# Patient Record
Sex: FEMALE | Race: WHITE | NOT HISPANIC OR LATINO | Employment: OTHER | URBAN - METROPOLITAN AREA
[De-identification: names, ages, dates, MRNs, and addresses within clinical notes are randomized per-mention and may not be internally consistent; named-entity substitution may affect disease eponyms.]

---

## 2019-09-13 ENCOUNTER — THERAPY VISIT (OUTPATIENT)
Dept: PHYSICAL THERAPY | Facility: CLINIC | Age: 69
End: 2019-09-13
Payer: MEDICARE

## 2019-09-13 DIAGNOSIS — M25.561 ACUTE PAIN OF RIGHT KNEE: ICD-10-CM

## 2019-09-13 PROCEDURE — 97112 NEUROMUSCULAR REEDUCATION: CPT | Mod: GP | Performed by: PHYSICAL THERAPIST

## 2019-09-13 PROCEDURE — 97140 MANUAL THERAPY 1/> REGIONS: CPT | Mod: GP | Performed by: PHYSICAL THERAPIST

## 2019-09-13 PROCEDURE — 97161 PT EVAL LOW COMPLEX 20 MIN: CPT | Mod: GP | Performed by: PHYSICAL THERAPIST

## 2019-09-13 ASSESSMENT — ACTIVITIES OF DAILY LIVING (ADL)
KNEE_ACTIVITY_OF_DAILY_LIVING_SUM: 22
RAW_SCORE: 22
GO DOWN STAIRS: I AM UNABLE TO DO THE ACTIVITY
GO UP STAIRS: I AM UNABLE TO DO THE ACTIVITY
AS_A_RESULT_OF_YOUR_KNEE_INJURY,_HOW_WOULD_YOU_RATE_YOUR_CURRENT_LEVEL_OF_DAILY_ACTIVITY?: SEVERELY ABNORMAL
KNEEL ON THE FRONT OF YOUR KNEE: ACTIVITY IS VERY DIFFICULT
PAIN: THE SYMPTOM AFFECTS MY ACTIVITY SEVERELY
WALK: ACTIVITY IS VERY DIFFICULT
SIT WITH YOUR KNEE BENT: ACTIVITY IS VERY DIFFICULT
LIMPING: THE SYMPTOM AFFECTS MY ACTIVITY SEVERELY
HOW_WOULD_YOU_RATE_THE_CURRENT_FUNCTION_OF_YOUR_KNEE_DURING_YOUR_USUAL_DAILY_ACTIVITIES_ON_A_SCALE_FROM_0_TO_100_WITH_100_BEING_YOUR_LEVEL_OF_KNEE_FUNCTION_PRIOR_TO_YOUR_INJURY_AND_0_BEING_THE_INABILITY_TO_PERFORM_ANY_OF_YOUR_USUAL_DAILY_ACTIVITIES?: 40
GIVING WAY, BUCKLING OR SHIFTING OF KNEE: I DO NOT HAVE THE SYMPTOM
STIFFNESS: THE SYMPTOM AFFECTS MY ACTIVITY SEVERELY
STAND: ACTIVITY IS FAIRLY DIFFICULT
HOW_WOULD_YOU_RATE_THE_OVERALL_FUNCTION_OF_YOUR_KNEE_DURING_YOUR_USUAL_DAILY_ACTIVITIES?: SEVERELY ABNORMAL
KNEE_ACTIVITY_OF_DAILY_LIVING_SCORE: 31.43
SQUAT: ACTIVITY IS VERY DIFFICULT
RISE FROM A CHAIR: ACTIVITY IS FAIRLY DIFFICULT
WEAKNESS: THE SYMPTOM AFFECTS MY ACTIVITY SEVERELY
SWELLING: I DO NOT HAVE THE SYMPTOM

## 2019-09-13 NOTE — PROGRESS NOTES
[facility] Initial Evaluation  Subjective:  The history is provided by the patient. No  was used.   Taty Carrington being seen for RIght Knee.   Problem began 9/2/2019. Where condition occurred: for unknown reasons.Problem occurred: Unknown reason; woke up 9/2/19 w/ pain   and reported as 9/10 (9/10 during activity) on pain scale. General health as reported by patient is good. Pertinent medical history includes:  Osteoarthritis, high blood pressure, overweight and migraines/headaches.  Medical allergies: other.  Surgeries include:  None.  Current medications:  Anti-inflammatory and high blood pressure medication.     Pain is described as aching and is intermittent. Pain is the same all the time. Since onset symptoms are unchanged. Special tests:  X-ray.     Patient is Retired previously pre-; Lives at home w/  in home with a basement .   Barriers include:  Stairs and transportation (stairs to basement; has not been driving self this week due to pain).  Red flags:  None as reported by patient.  Type of problem:  Bilateral knees (R>L)   Condition occurred with:  Degenerative joint disease. This is a new condition   Problem details: Has had B knee pain for past 4 years; exacerbation of R knee 9/2/19, worse than previously.   Patient reports pain:  Medial. Radiates to: none. Associated symptoms:  Edema, loss of motion/stiffness and tingling. Symptoms are exacerbated by walking, transfers and sitting (avoiding kneeling, squatting, and stairs) and relieved by ice, NSAID's and rest.                      Objective:  System                                           Hip Evaluation    Hip Strength:    Flexion:   Left: 4+/5   Pain:  Right: 4+/5   Pain:                    Extension:  Left: 4+/5  Pain:Right: 4+/5    Pain:    Abduction:  Left: 4+/5     Pain:Right: 4+/5    Pain:                           Knee Evaluation:  ROM:    AROM    Hyperextension: Left:  5     Right:  Extension: Left:    Right:  Lacking 3 ++pain  Flexion: Left: 135    Right: 120 +pain         Strength:     Extension:  Left: 5/5   Pain:      Right: 5/5   Pain:  Flexion:  Left: 5/5   Pain:      Right: 3+/5    Pain:+++    Quad Set Left: Good    Pain:   Quad Set Right: Good    Pain:  Ligament Testing:  Normal                  Palpation:  Palpation of knee: tenderness R pes anserine.    Right knee tenderness present at:  Semitendinosus  Right knee tenderness not present at:  Medial Joint Line            General     ROS    Assessment/Plan:    Patient is a 69 year old female with right side knee complaints.    Patient has the following significant findings with corresponding treatment plan.                Diagnosis 1:  R Knee pain consistent w/ hamstring tendinitis  Pain -  hot/cold therapy, US, electric stimulation, manual therapy, splint/taping/bracing/orthotics, self management, education and home program  Decreased ROM/flexibility - manual therapy, therapeutic exercise, therapeutic activity and home program  Decreased strength - therapeutic exercise, therapeutic activities and home program  Inflammation - cold therapy, US and self management/home program  Impaired gait - gait training and home program  Impaired muscle performance - neuro re-education and home program  Decreased function - therapeutic activities and home program    Therapy Evaluation Codes:   Cumulative Therapy Evaluation is: Low complexity.    Previous and current functional limitations:  (See Goal Flow Sheet for this information)    Short term and Long term goals: (See Goal Flow Sheet for this information)     Communication ability:  Patient appears to be able to clearly communicate and understand verbal and written communication and follow directions correctly.  Treatment Explanation - The following has been discussed with the patient:   RX ordered/plan of care  Anticipated outcomes  Possible risks and side effects  This patient would  benefit from PT intervention to resume normal activities.   Rehab potential is good.    Frequency:  1 X week, once daily  Duration:  for 6 weeks  Discharge Plan:  Achieve all LTG.  Independent in home treatment program.  Reach maximal therapeutic benefit.    Please refer to the daily flowsheet for treatment today, total treatment time and time spent performing 1:1 timed codes.

## 2019-09-13 NOTE — LETTER
DEPARTMENT OF HEALTH AND HUMAN SERVICES  [facility] FOR MEDICARE & MEDICAID SERVICES    PLAN/UPDATED PLAN OF PROGRESS FOR OUTPATIENT REHABILITATION    PATIENTS NAME:  Taty Carrington     : 1950    PROVIDER NUMBER:    [id number]    HICN:   [id number]    PROVIDER NAME: [facility]    MEDICAL RECORD NUMBER: [id number]     START OF CARE DATE:  SOC Date: 19   TYPE:  PT    PRIMARY/TREATMENT DIAGNOSIS: (Pertinent Medical Diagnosis)  Acute pain of right knee    VISITS FROM START OF CARE:  Rxs Used: 1     [facility] Initial Evaluation  Subjective:  The history is provided by the patient. No  was used.   Taty Carrington being seen for RIght Knee.   Problem began 2019. Where condition occurred: for unknown reasons.Problem occurred: Unknown reason; woke up 19 w/ pain   and reported as 9/10 (9/10 during activity) on pain scale. General health as reported by patient is good. Pertinent medical history includes:  Osteoarthritis, high blood pressure, overweight and migraines/headaches.  Medical allergies: other.  Surgeries include:  None.  Current medications:  Anti-inflammatory and high blood pressure medication.     Pain is described as aching and is intermittent. Pain is the same all the time. Since onset symptoms are unchanged. Special tests:  X-ray.     Patient is Retired previously pre-; Lives at home w/  in home with a basement .   Barriers include:  Stairs and transportation (stairs to basement; has not been driving self this week due to pain).  Red flags:  None as reported by patient.  Type of problem:  Bilateral knees (R>L)   Condition occurred with:  Degenerative joint disease. This is a new condition   Problem details: Has had B knee pain for past 4 years; exacerbation of R knee 19, worse than previously.   Patient reports pain:  Medial. Radiates to: none. Associated symptoms:  Edema, loss of  motion/stiffness and tingling. Symptoms are exacerbated by walking, transfers and sitting (avoiding kneeling, squatting, and stairs) and relieved by ice, NSAID's and rest.              Objective:         Hip Evaluation  Hip Strength:    Flexion:   Left: 4+/5   Pain:  Right: 4+/5   Pain:               Extension:  Left: 4+/5  Pain:Right: 4+/5    Pain:    Abduction:  Left: 4+/5     Pain:Right: 4+/5    Pain:       Knee Evaluation:  ROM:    AROM  Hyperextension: Left:  5    Right:  Extension: Left:    Right:  Lacking 3 ++pain  Flexion: Left: 135    Right: 120 +pain     Strength:   Extension:  Left: 5/5   Pain:      Right: 5/5   Pain:  Flexion:  Left: 5/5   Pain:      Right: 3+/5    Pain:+++    Quad Set Left: Good    Pain:   Quad Set Right: Good    Pain:    Ligament Testing:  Normal    Palpation:  Palpation of knee: tenderness R pes anserine.  Right knee tenderness present at:  Semitendinosus  Right knee tenderness not present at:  Medial Joint Line    Assessment/Plan:    Patient is a 69 year old female with right side knee complaints.    Patient has the following significant findings with corresponding treatment plan.                Diagnosis 1:  R Knee pain consistent w/ hamstring tendinitis  Pain -  hot/cold therapy, US, electric stimulation, manual therapy, splint/taping/bracing/orthotics, self management, education and home program  Decreased ROM/flexibility - manual therapy, therapeutic exercise, therapeutic activity and home program  Decreased strength - therapeutic exercise, therapeutic activities and home program  Inflammation - cold therapy, US and self management/home program  Impaired gait - gait training and home program  Impaired muscle performance - neuro re-education and home program  Decreased function - therapeutic activities and home program    Therapy Evaluation Codes:   Cumulative Therapy Evaluation is: Low complexity.    Previous and current functional limitations:  (See Goal Flow Sheet for this  "information)    Short term and Long term goals: (See Goal Flow Sheet for this information)     Communication ability:  Patient appears to be able to clearly communicate and understand verbal and written communication and follow directions correctly.  Treatment Explanation - The following has been discussed with the patient:   RX ordered/plan of care  Anticipated outcomes  Possible risks and side effects  This patient would benefit from PT intervention to resume normal activities.   Rehab potential is good.    Frequency:  1 X week, once daily  Duration:  for 6 weeks  Discharge Plan:  Achieve all LTG.  Independent in home treatment program.  Reach maximal therapeutic benefit.    Caregiver Signature/Credentials _____________________________ Date ________       Treating Provider: Amanda Hilligoss, DPT   I have reviewed and certified the need for these services and plan of treatment while under my care.        PHYSICIAN'S SIGNATURE:   _________________________________________  Date___________   Kin Ragland MD    Certification period:  Beginning of Cert date period: 09/13/19 to  End of Cert period date: 11/22/19     Functional Level Progress Report: Please see attached \"Goal Flow sheet for Functional level.\"    ____X____ Continue Services or       ________ DC Services                Service dates: From  SOC Date: 09/13/19 date to present                         "

## 2019-09-20 ENCOUNTER — THERAPY VISIT (OUTPATIENT)
Dept: PHYSICAL THERAPY | Facility: CLINIC | Age: 69
End: 2019-09-20
Payer: MEDICARE

## 2019-09-20 DIAGNOSIS — M25.561 ACUTE PAIN OF RIGHT KNEE: ICD-10-CM

## 2019-09-20 PROCEDURE — 97110 THERAPEUTIC EXERCISES: CPT | Mod: GP | Performed by: PHYSICAL THERAPIST

## 2019-09-20 PROCEDURE — 97140 MANUAL THERAPY 1/> REGIONS: CPT | Mod: GP | Performed by: PHYSICAL THERAPIST

## 2019-09-27 ENCOUNTER — THERAPY VISIT (OUTPATIENT)
Dept: PHYSICAL THERAPY | Facility: CLINIC | Age: 69
End: 2019-09-27
Payer: MEDICARE

## 2019-09-27 DIAGNOSIS — M25.561 ACUTE PAIN OF RIGHT KNEE: ICD-10-CM

## 2019-09-27 PROCEDURE — 97110 THERAPEUTIC EXERCISES: CPT | Mod: GP | Performed by: PHYSICAL THERAPIST

## 2019-09-27 PROCEDURE — 97140 MANUAL THERAPY 1/> REGIONS: CPT | Mod: GP | Performed by: PHYSICAL THERAPIST

## 2019-10-04 ENCOUNTER — THERAPY VISIT (OUTPATIENT)
Dept: PHYSICAL THERAPY | Facility: CLINIC | Age: 69
End: 2019-10-04
Payer: MEDICARE

## 2019-10-04 DIAGNOSIS — M25.561 ACUTE PAIN OF RIGHT KNEE: ICD-10-CM

## 2019-10-04 PROCEDURE — 97110 THERAPEUTIC EXERCISES: CPT | Mod: GP | Performed by: PHYSICAL THERAPIST

## 2019-10-04 PROCEDURE — 97140 MANUAL THERAPY 1/> REGIONS: CPT | Mod: GP | Performed by: PHYSICAL THERAPIST

## 2019-10-18 ENCOUNTER — THERAPY VISIT (OUTPATIENT)
Dept: PHYSICAL THERAPY | Facility: CLINIC | Age: 69
End: 2019-10-18
Payer: MEDICARE

## 2019-10-18 DIAGNOSIS — M25.561 ACUTE PAIN OF RIGHT KNEE: ICD-10-CM

## 2019-10-18 PROCEDURE — 97140 MANUAL THERAPY 1/> REGIONS: CPT | Mod: GP | Performed by: PHYSICAL THERAPIST

## 2019-10-18 PROCEDURE — 97110 THERAPEUTIC EXERCISES: CPT | Mod: GP | Performed by: PHYSICAL THERAPIST

## 2019-10-18 PROCEDURE — 97112 NEUROMUSCULAR REEDUCATION: CPT | Mod: GP | Performed by: PHYSICAL THERAPIST

## 2019-10-18 ASSESSMENT — ACTIVITIES OF DAILY LIVING (ADL)
SWELLING: I DO NOT HAVE THE SYMPTOM
RAW_SCORE: 50
KNEE_ACTIVITY_OF_DAILY_LIVING_SCORE: 71.43
KNEE_ACTIVITY_OF_DAILY_LIVING_SUM: 50
GO UP STAIRS: ACTIVITY IS SOMEWHAT DIFFICULT
KNEEL ON THE FRONT OF YOUR KNEE: ACTIVITY IS VERY DIFFICULT
WALK: ACTIVITY IS MINIMALLY DIFFICULT
HOW_WOULD_YOU_RATE_THE_OVERALL_FUNCTION_OF_YOUR_KNEE_DURING_YOUR_USUAL_DAILY_ACTIVITIES?: NEARLY NORMAL
LIMPING: I DO NOT HAVE THE SYMPTOM
GIVING WAY, BUCKLING OR SHIFTING OF KNEE: I DO NOT HAVE THE SYMPTOM
SIT WITH YOUR KNEE BENT: ACTIVITY IS NOT DIFFICULT
GO DOWN STAIRS: ACTIVITY IS SOMEWHAT DIFFICULT
RISE FROM A CHAIR: ACTIVITY IS NOT DIFFICULT
STIFFNESS: THE SYMPTOM AFFECTS MY ACTIVITY SLIGHTLY
SQUAT: ACTIVITY IS VERY DIFFICULT
STAND: ACTIVITY IS MINIMALLY DIFFICULT
WEAKNESS: THE SYMPTOM AFFECTS MY ACTIVITY SLIGHTLY
HOW_WOULD_YOU_RATE_THE_CURRENT_FUNCTION_OF_YOUR_KNEE_DURING_YOUR_USUAL_DAILY_ACTIVITIES_ON_A_SCALE_FROM_0_TO_100_WITH_100_BEING_YOUR_LEVEL_OF_KNEE_FUNCTION_PRIOR_TO_YOUR_INJURY_AND_0_BEING_THE_INABILITY_TO_PERFORM_ANY_OF_YOUR_USUAL_DAILY_ACTIVITIES?: 90
PAIN: THE SYMPTOM AFFECTS MY ACTIVITY SLIGHTLY
AS_A_RESULT_OF_YOUR_KNEE_INJURY,_HOW_WOULD_YOU_RATE_YOUR_CURRENT_LEVEL_OF_DAILY_ACTIVITY?: NEARLY NORMAL

## 2019-10-18 NOTE — LETTER
[facility] PH[facility]ICAL [facility]  [address]  [facility] [id number] [phone]    2019    Re: Taty Carrington   :   1950  MRN:  [id number]   REFERRING PHYSICIAN:   Kin Ragland    [facility]    Date of Initial Evaluation: 2019  Visits:  Rxs Used: 5  Reason for Referral:  Acute pain of right knee    EVALUATION SUMMARY    DISCHARGE  REPORT    Progress reporting period is from 19 to 10/18/19.       SUBJECTIVE  Pt notes low back pain is more aggravating than knee pain today. Feels pain in R leg is much less. Does still get knee joint pain (but this has been present for much longer than leg pain being seen for). Has been working on increasing how long she can walk, but feels low back limits this now. Plans to continue independently w/ knee exercises, but may return to MD to discuss low back pain.     Current Pain level: 10.     Initial Pain level: 10.   Changes in function:  Yes (See Goal flowsheet attached for changes in current functional level)  Adverse reaction to treatment or activity: None    OBJECTIVE  R Knee AROM (heelslide) flex 125 +pain, Hyperext 5;   Hip strength Flex L 5/5, R 4/5 +pain, Abd L 3+/5, R 3+/5;   Knee strength: Flex 5/5 B, Ext 5/5 B   SLS (best of 3 trials) L 10 sec, R 10 sec     Knee Activity of Daily Living Score: 71.43       ASSESSMENT/PLAN  Updated problem list and treatment plan: Diagnosis 1:  R Knee pain consistent w/ hamstring tendinitis  Pain -  self management and home program  Decreased ROM/flexibility - home program  Decreased strength - home program  Impaired balance - home program  Impaired gait - home program  Impaired muscle performance - home program  STG/LTGs have been met or progress has been made towards goals:  Yes (See Goal flow sheet completed today.)  Re: Taty Carrington   :   1950    Assessment of Progress: The patient has met all of  their long term goals.  Self Management Plans:  Patient is independent in a home treatment program.  Patient is independent in self management of symptoms.  I have re-evaluated this patient and find that PT intervention is no longer required to meet STG/LTG.    Recommendations:  This patient is ready to be discharged from therapy and continue their home treatment program.      Thank you for your referral.    INQUIRIES  Therapist: Amanda Hilligoss, PT, DPT  [facility]  [address] [place] [address] [address]  [facility] [address]  Phone: [phone]  Fax: [phone]

## 2019-10-18 NOTE — PROGRESS NOTES
DISCHARGE  REPORT    Progress reporting period is from 9/13/19 to 10/18/19.       SUBJECTIVE  Pt notes low back pain is more aggravating than knee pain today. Feels pain in R leg is much less. Does still get knee joint pain (but this has been present for much longer than leg pain being seen for). Has been working on increasing how long she can walk, but feels low back limits this now. Plans to continue independently w/ knee exercises, but may return to MD to discuss low back pain.     Current Pain level: 2/10.     Initial Pain level: 9/10.   Changes in function:  Yes (See Goal flowsheet attached for changes in current functional level)  Adverse reaction to treatment or activity: None    OBJECTIVE  R Knee AROM (heelslide) flex 125 +pain, Hyperext 5;   Hip strength Flex L 5/5, R 4/5 +pain, Abd L 3+/5, R 3+/5;   Knee strength: Flex 5/5 B, Ext 5/5 B   SLS (best of 3 trials) L 10 sec, R 10 sec     Knee Activity of Daily Living Score: 71.43       ASSESSMENT/PLAN  Updated problem list and treatment plan: Diagnosis 1:  R Knee pain consistent w/ hamstring tendinitis  Pain -  self management and home program  Decreased ROM/flexibility - home program  Decreased strength - home program  Impaired balance - home program  Impaired gait - home program  Impaired muscle performance - home program  STG/LTGs have been met or progress has been made towards goals:  Yes (See Goal flow sheet completed today.)  Assessment of Progress: The patient has met all of their long term goals.  Self Management Plans:  Patient is independent in a home treatment program.  Patient is independent in self management of symptoms.  I have re-evaluated this patient and find that PT intervention is no longer required to meet STG/LTG.    Recommendations:  This patient is ready to be discharged from therapy and continue their home treatment program.    Please refer to the daily flowsheet for treatment today, total treatment time and time spent performing 1:1  timed codes.

## 2019-11-01 ENCOUNTER — THERAPY VISIT (OUTPATIENT)
Dept: PHYSICAL THERAPY | Facility: CLINIC | Age: 69
End: 2019-11-01
Payer: MEDICARE

## 2019-11-01 DIAGNOSIS — M54.41 CHRONIC BILATERAL LOW BACK PAIN WITH BILATERAL SCIATICA: ICD-10-CM

## 2019-11-01 DIAGNOSIS — G89.29 CHRONIC BILATERAL LOW BACK PAIN WITH BILATERAL SCIATICA: ICD-10-CM

## 2019-11-01 DIAGNOSIS — M54.42 CHRONIC BILATERAL LOW BACK PAIN WITH BILATERAL SCIATICA: ICD-10-CM

## 2019-11-01 PROCEDURE — 97110 THERAPEUTIC EXERCISES: CPT | Mod: GP | Performed by: PHYSICAL THERAPIST

## 2019-11-01 PROCEDURE — 97140 MANUAL THERAPY 1/> REGIONS: CPT | Mod: GP | Performed by: PHYSICAL THERAPIST

## 2019-11-01 PROCEDURE — 97161 PT EVAL LOW COMPLEX 20 MIN: CPT | Mod: GP | Performed by: PHYSICAL THERAPIST

## 2019-11-01 NOTE — LETTER
DEPARTMENT OF HEALTH AND HUMAN SERVICES  [facility] MEDICA[facility]    PLAN/UPDATED PLAN OF PROGRESS FOR OUTPATIENT REHABILITATION    PATIENTS NAME:  Taty Carrington     : 1950    PROVIDER NUMBER:    [id number]    HICN:  [id number]    PROVIDER NAME: [facility]    MEDICAL RECORD NUMBER: [id number]     START OF CARE DATE:  SOC Date: 19   TYPE:  PT    PRIMARY/TREATMENT DIAGNOSIS: (Pertinent Medical Diagnosis)  Chronic bilateral low back pain with bilateral sciatica    VISITS FROM START OF CARE:  Rxs Used: 1     [facility] Initial Evaluation  Subjective:  The history is provided by the patient. No  was used.   Type of problem:  Lumbar   Condition occurred with:  Insidious onset. This is a chronic condition    Patient reports pain:  Central lumbar spine and lower lumbar spine. Radiates to:  Gluteals right and gluteals left (R>L). Associated symptoms:  Numbness, tingling, loss of motion/stiffness and loss of strength (numbness/tingling to knee B). Symptoms are exacerbated by standing and walking (transfers from sitting) and relieved by rest, NSAID's and ice (leaning on cart, sitting).    Taty Carrington being seen for Low back and B glutes (R >L).   Problem began 10/23/2019 (date of referral; has had low back pain for past couple years w/ exacerbation of sx around 2019). Where condition occurred: for unknown reasons.Problem occurred: unknown  and reported as 7/10 on pain scale.             Pain is described as aching and sharp  Pain is the same all the time. Since onset symptoms are gradually worsening. Imaging testing: no imaging for low back yet. Past treatment: none for low back.       Barriers include:  Stairs.  Red flags:  None as reported by patient.  General health as reported by patient is good. Pertinent medical history includes:  Osteoarthritis, high blood pressure, overweight and  migraines/headaches.  Medical allergies: other.  Surgeries include:  None.  Current medications:  Anti-inflammatory and high blood pressure medication.       Patient is Retired previously pre-; Lives at home w/  in home with a basement .                      Objective:  Standing Alignment:    Cervical/Thoracic:  Forward head and Dowager's hump       Lumbar/SI Evaluation  ROM:    AROM Lumbar:   Flexion:            To toes  Ext:                    50%   Side Bend:        Left:  To knee    Right:  Lower thigh ++pain  Rotation:           Left:  Min limitation    Right:  Min limitation ++pain R  Side Glide:        Left:     Right:         Lumbar Myotomes:  Lumbar myotomes: all levels WNL/ EQ B when tested in sitting; (-) toe walking, (-) Heel walking.    SI joint/Sacrum:    (-) Active SLR B  (+) NATHAN R>L (pain in R SI/glute w/ R NATHAN< pain in L lateral hip w/ L NATHAN)  (+) Torsion R  (+)Posterior Innominate L      Assessment/Plan:    Patient is a 69 year old female with lumbar complaints.    Patient has the following significant findings with corresponding treatment plan.                Diagnosis 1:  Low Back Pain -  hot/cold therapy, electric stimulation, manual therapy, splint/taping/bracing/orthotics, self management, education, directional preference exercise and home program  Decreased ROM/flexibility - manual therapy, therapeutic exercise, therapeutic activity and home program  Decreased strength - therapeutic exercise, therapeutic activities and home program  Impaired gait - gait training and home program  Impaired muscle performance - neuro re-education and home program  Decreased function - therapeutic activities and home program  Impaired posture - neuro re-education, therapeutic activities and home program    Therapy Evaluation Codes:   1) History comprised of:   Personal factors that impact the plan of care:      Age, Coping style and Time since onset of symptoms.    Comorbidity factors  that impact the plan of care are:      High blood pressure, Migraines/headaches, Osteoarthritis and Overweight.     Medications impacting care: Anti-depressant and High blood pressure.  2) Examination of Body Systems comprised of:   Body structures and functions that impact the plan of care:      Lumbar spine and Sacral illiac joint.   Activity limitations that impact the plan of care are:      Cooking, Lifting, Walking and Working.  3) Clinical presentation characteristics are:   Stable/Uncomplicated.  4) Decision-Making    Low complexity using standardized patient assessment instrument and/or measureable assessment of functional outcome.  Cumulative Therapy Evaluation is: Low complexity.      Previous and current functional limitations:  (See Goal Flow Sheet for this information)    Short term and Long term goals: (See Goal Flow Sheet for this information)     Communication ability:  Patient appears to be able to clearly communicate and understand verbal and written communication and follow directions correctly.  Treatment Explanation - The following has been discussed with the patient:   RX ordered/plan of care  Anticipated outcomes  Possible risks and side effects  This patient would benefit from PT intervention to resume normal activities.   Rehab potential is good.    Frequency:  1 X week, once daily  Duration:  for 6 weeks  Discharge Plan:  Achieve all LTG.  Independent in home treatment program.  Reach maximal therapeutic benefit.    Caregiver Signature/Credentials _____________________________ Date ________       Treating Provider: Amanda Hilligoss, DPT   I have reviewed and certified the need for these services and plan of treatment while under my care.        PHYSICIAN'S SIGNATURE:   _________________________________________  Date___________   Kin Ragland MD    Certification period:  Beginning of Cert date period: 11/01/19 to  End of Cert period date: 01/10/20     Functional Level Progress  "Report: Please see attached \"Goal Flow sheet for Functional level.\"    ____X____ Continue Services or       ________ DC Services                Service dates: From  SOC Date: 11/01/19 date to present                         "

## 2019-11-01 NOTE — PROGRESS NOTES
[facility] Initial Evaluation  Subjective:  The history is provided by the patient. No  was used.   Type of problem:  Lumbar   Condition occurred with:  Insidious onset. This is a chronic condition    Patient reports pain:  Central lumbar spine and lower lumbar spine. Radiates to:  Gluteals right and gluteals left (R>L). Associated symptoms:  Numbness, tingling, loss of motion/stiffness and loss of strength (numbness/tingling to knee B). Symptoms are exacerbated by standing and walking (transfers from sitting) and relieved by rest, NSAID's and ice (leaning on cart, sitting).    Taty Carrington being seen for Low back and B glutes (R >L).   Problem began 10/23/2019 (date of referral; has had low back pain for past couple years w/ exacerbation of sx around September 2019). Where condition occurred: for unknown reasons.Problem occurred: unknown  and reported as 7/10 on pain scale.             Pain is described as aching and sharp  Pain is the same all the time. Since onset symptoms are gradually worsening. Imaging testing: no imaging for low back yet. Past treatment: none for low back.       Barriers include:  Stairs.  Red flags:  None as reported by patient.  General health as reported by patient is good. Pertinent medical history includes:  Osteoarthritis, high blood pressure, overweight and migraines/headaches.  Medical allergies: other.  Surgeries include:  None.  Current medications:  Anti-inflammatory and high blood pressure medication.       Patient is Retired previously pre-; Lives at home w/  in home with a basement .                     Objective:  Standing Alignment:    Cervical/Thoracic:  Forward head and Dowager's hump                               Lumbar/SI Evaluation  ROM:    AROM Lumbar:   Flexion:            To toes  Ext:                    50%   Side Bend:        Left:  To knee    Right:  Lower thigh ++pain  Rotation:           Left:  Min  limitation    Right:  Min limitation ++pain R  Side Glide:        Left:     Right:           Lumbar Myotomes:  Lumbar myotomes: all levels WNL/ EQ B when tested in sitting; (-) toe walking, (-) Heel walking.                            SI joint/Sacrum:    (-) Active SLR B  (+) NATHAN R>L (pain in R SI/glute w/ R NATHAN< pain in L lateral hip w/ L NATHAN)  (+) Torsion R  (+)Posterior Innominate L                                                       General     ROS    Assessment/Plan:    Patient is a 69 year old female with lumbar complaints.    Patient has the following significant findings with corresponding treatment plan.                Diagnosis 1:  Low Back Pain -  hot/cold therapy, electric stimulation, manual therapy, splint/taping/bracing/orthotics, self management, education, directional preference exercise and home program  Decreased ROM/flexibility - manual therapy, therapeutic exercise, therapeutic activity and home program  Decreased strength - therapeutic exercise, therapeutic activities and home program  Impaired gait - gait training and home program  Impaired muscle performance - neuro re-education and home program  Decreased function - therapeutic activities and home program  Impaired posture - neuro re-education, therapeutic activities and home program    Therapy Evaluation Codes:   1) History comprised of:   Personal factors that impact the plan of care:      Age, Coping style and Time since onset of symptoms.    Comorbidity factors that impact the plan of care are:      High blood pressure, Migraines/headaches, Osteoarthritis and Overweight.     Medications impacting care: Anti-depressant and High blood pressure.  2) Examination of Body Systems comprised of:   Body structures and functions that impact the plan of care:      Lumbar spine and Sacral illiac joint.   Activity limitations that impact the plan of care are:      Cooking, Lifting, Walking and Working.  3) Clinical presentation  characteristics are:   Stable/Uncomplicated.  4) Decision-Making    Low complexity using standardized patient assessment instrument and/or measureable assessment of functional outcome.  Cumulative Therapy Evaluation is: Low complexity.    Previous and current functional limitations:  (See Goal Flow Sheet for this information)    Short term and Long term goals: (See Goal Flow Sheet for this information)     Communication ability:  Patient appears to be able to clearly communicate and understand verbal and written communication and follow directions correctly.  Treatment Explanation - The following has been discussed with the patient:   RX ordered/plan of care  Anticipated outcomes  Possible risks and side effects  This patient would benefit from PT intervention to resume normal activities.   Rehab potential is good.    Frequency:  1 X week, once daily  Duration:  for 6 weeks  Discharge Plan:  Achieve all LTG.  Independent in home treatment program.  Reach maximal therapeutic benefit.    Please refer to the daily flowsheet for treatment today, total treatment time and time spent performing 1:1 timed codes.

## 2019-11-06 ENCOUNTER — THERAPY VISIT (OUTPATIENT)
Dept: PHYSICAL THERAPY | Facility: CLINIC | Age: 69
End: 2019-11-06
Payer: MEDICARE

## 2019-11-06 DIAGNOSIS — M54.42 CHRONIC BILATERAL LOW BACK PAIN WITH BILATERAL SCIATICA: ICD-10-CM

## 2019-11-06 DIAGNOSIS — G89.29 CHRONIC BILATERAL LOW BACK PAIN WITH BILATERAL SCIATICA: ICD-10-CM

## 2019-11-06 DIAGNOSIS — M54.41 CHRONIC BILATERAL LOW BACK PAIN WITH BILATERAL SCIATICA: ICD-10-CM

## 2019-11-06 PROCEDURE — 97110 THERAPEUTIC EXERCISES: CPT | Mod: GP | Performed by: PHYSICAL THERAPIST

## 2019-11-06 PROCEDURE — G0283 ELEC STIM OTHER THAN WOUND: HCPCS | Mod: GP | Performed by: PHYSICAL THERAPIST

## 2019-11-06 PROCEDURE — 97112 NEUROMUSCULAR REEDUCATION: CPT | Mod: GP | Performed by: PHYSICAL THERAPIST

## 2019-11-06 PROCEDURE — 97140 MANUAL THERAPY 1/> REGIONS: CPT | Mod: GP | Performed by: PHYSICAL THERAPIST

## 2019-11-14 ENCOUNTER — THERAPY VISIT (OUTPATIENT)
Dept: PHYSICAL THERAPY | Facility: CLINIC | Age: 69
End: 2019-11-14
Payer: MEDICARE

## 2019-11-14 DIAGNOSIS — M54.41 CHRONIC BILATERAL LOW BACK PAIN WITH BILATERAL SCIATICA: ICD-10-CM

## 2019-11-14 DIAGNOSIS — G89.29 CHRONIC BILATERAL LOW BACK PAIN WITH BILATERAL SCIATICA: ICD-10-CM

## 2019-11-14 DIAGNOSIS — M54.42 CHRONIC BILATERAL LOW BACK PAIN WITH BILATERAL SCIATICA: ICD-10-CM

## 2019-11-14 PROCEDURE — G0283 ELEC STIM OTHER THAN WOUND: HCPCS | Mod: GP | Performed by: PHYSICAL THERAPIST

## 2019-11-14 PROCEDURE — 97112 NEUROMUSCULAR REEDUCATION: CPT | Mod: GP | Performed by: PHYSICAL THERAPIST

## 2019-11-14 PROCEDURE — 97140 MANUAL THERAPY 1/> REGIONS: CPT | Mod: GP | Performed by: PHYSICAL THERAPIST

## 2019-11-14 PROCEDURE — 97110 THERAPEUTIC EXERCISES: CPT | Mod: GP | Performed by: PHYSICAL THERAPIST

## 2019-11-20 ENCOUNTER — THERAPY VISIT (OUTPATIENT)
Dept: PHYSICAL THERAPY | Facility: CLINIC | Age: 69
End: 2019-11-20
Payer: MEDICARE

## 2019-11-20 DIAGNOSIS — M54.42 CHRONIC BILATERAL LOW BACK PAIN WITH BILATERAL SCIATICA: ICD-10-CM

## 2019-11-20 DIAGNOSIS — G89.29 CHRONIC BILATERAL LOW BACK PAIN WITH BILATERAL SCIATICA: ICD-10-CM

## 2019-11-20 DIAGNOSIS — M54.41 CHRONIC BILATERAL LOW BACK PAIN WITH BILATERAL SCIATICA: ICD-10-CM

## 2019-11-20 PROCEDURE — G0283 ELEC STIM OTHER THAN WOUND: HCPCS | Mod: GP | Performed by: PHYSICAL THERAPIST

## 2019-11-20 PROCEDURE — 97112 NEUROMUSCULAR REEDUCATION: CPT | Mod: GP | Performed by: PHYSICAL THERAPIST

## 2019-11-20 PROCEDURE — 97110 THERAPEUTIC EXERCISES: CPT | Mod: GP | Performed by: PHYSICAL THERAPIST

## 2019-11-20 PROCEDURE — 97140 MANUAL THERAPY 1/> REGIONS: CPT | Mod: GP | Performed by: PHYSICAL THERAPIST

## 2019-12-04 ENCOUNTER — THERAPY VISIT (OUTPATIENT)
Dept: PHYSICAL THERAPY | Facility: CLINIC | Age: 69
End: 2019-12-04
Payer: MEDICARE

## 2019-12-04 DIAGNOSIS — G89.29 CHRONIC BILATERAL LOW BACK PAIN WITH BILATERAL SCIATICA: ICD-10-CM

## 2019-12-04 DIAGNOSIS — M54.41 CHRONIC BILATERAL LOW BACK PAIN WITH BILATERAL SCIATICA: ICD-10-CM

## 2019-12-04 DIAGNOSIS — M54.42 CHRONIC BILATERAL LOW BACK PAIN WITH BILATERAL SCIATICA: ICD-10-CM

## 2019-12-04 PROCEDURE — 97140 MANUAL THERAPY 1/> REGIONS: CPT | Mod: GP | Performed by: PHYSICAL THERAPIST

## 2019-12-04 PROCEDURE — 97110 THERAPEUTIC EXERCISES: CPT | Mod: GP | Performed by: PHYSICAL THERAPIST

## 2019-12-04 PROCEDURE — G0283 ELEC STIM OTHER THAN WOUND: HCPCS | Mod: GP | Performed by: PHYSICAL THERAPIST

## 2019-12-04 PROCEDURE — 97530 THERAPEUTIC ACTIVITIES: CPT | Mod: GP | Performed by: PHYSICAL THERAPIST

## 2019-12-11 ENCOUNTER — THERAPY VISIT (OUTPATIENT)
Dept: PHYSICAL THERAPY | Facility: CLINIC | Age: 69
End: 2019-12-11
Payer: MEDICARE

## 2019-12-11 DIAGNOSIS — G89.29 CHRONIC BILATERAL LOW BACK PAIN WITH BILATERAL SCIATICA: ICD-10-CM

## 2019-12-11 DIAGNOSIS — M54.42 CHRONIC BILATERAL LOW BACK PAIN WITH BILATERAL SCIATICA: ICD-10-CM

## 2019-12-11 DIAGNOSIS — M54.41 CHRONIC BILATERAL LOW BACK PAIN WITH BILATERAL SCIATICA: ICD-10-CM

## 2019-12-11 PROCEDURE — 97110 THERAPEUTIC EXERCISES: CPT | Mod: GP | Performed by: PHYSICAL THERAPIST

## 2019-12-11 PROCEDURE — 97530 THERAPEUTIC ACTIVITIES: CPT | Mod: GP | Performed by: PHYSICAL THERAPIST

## 2019-12-11 PROCEDURE — G0283 ELEC STIM OTHER THAN WOUND: HCPCS | Mod: GP | Performed by: PHYSICAL THERAPIST

## 2019-12-11 PROCEDURE — 97140 MANUAL THERAPY 1/> REGIONS: CPT | Mod: GP | Performed by: PHYSICAL THERAPIST

## 2019-12-11 NOTE — PROGRESS NOTES
DISCHARGE REPORT    Progress reporting period is from 11/1/19 to 12/11/19.       SUBJECTIVE  Pt states she has been able to stand much longer before back pain increases (at least 30 min before having to sit and rest). Has been able to make it through more household tasks w/o increasing pain. Has not been walking as much due to cold weather, but has been looking into getting a gym membership for the winter. Does feel general joint ache, but feels it may be due to cold weather. Self reported 90% improvement. Feels she is ready to continue progressing independently w/ HEP for now.    Current Pain level: 2/10.     Initial Pain level: 7/10.   Changes in function:  Yes (See Goal flowsheet attached for changes in current functional level)  Adverse reaction to treatment or activity: None    OBJECTIVE  Gait: Improved upright posture for ambulation.   Posture: Continues to have increased anterior pelvic tilt.  Lumbar AROM: Flex to floor, Ext 50%, SB to knee B, Rotation min limitation B;   Pelvic Girdle/LE strength: Hip Flex 5/5 B, Hip Abd L 5/5, R 4+/5, Hip Ext 5/5 B, Knee flex 5/5 B     Oswestry Score: 26.67 %      ASSESSMENT/PLAN  Updated problem list and treatment plan: Diagnosis 1:  Diagnosis 1:  Low Back Pain   Pain -  self management and home program  Decreased ROM/flexibility - home program  Decreased strength - home program  Impaired gait - home program  Impaired muscle performance - home program  Decreased function - home program  Impaired posture - home program  STG/LTGs have been met or progress has been made towards goals:  Yes (See Goal flow sheet completed today.)  Assessment of Progress: Patient is meeting short term goals and is progressing towards long term goals.  Self Management Plans:  Patient is independent in a home treatment program.  Patient is independent in self management of symptoms.  I have re-evaluated this patient and find that PT intervention is no longer required to meet  STG/LTG.    Recommendations:  This patient is ready to be discharged from therapy and continue their home treatment program.    Please refer to the daily flowsheet for treatment today, total treatment time and time spent performing 1:1 timed codes.

## 2020-03-11 ENCOUNTER — HEALTH MAINTENANCE LETTER (OUTPATIENT)
Age: 70
End: 2020-03-11

## 2021-01-03 ENCOUNTER — HEALTH MAINTENANCE LETTER (OUTPATIENT)
Age: 71
End: 2021-01-03

## 2021-04-25 ENCOUNTER — HEALTH MAINTENANCE LETTER (OUTPATIENT)
Age: 71
End: 2021-04-25

## 2021-09-01 ENCOUNTER — THERAPY VISIT (OUTPATIENT)
Dept: PHYSICAL THERAPY | Facility: CLINIC | Age: 71
End: 2021-09-01
Payer: MEDICARE

## 2021-09-01 DIAGNOSIS — M54.42 CHRONIC BILATERAL LOW BACK PAIN WITH BILATERAL SCIATICA: Primary | ICD-10-CM

## 2021-09-01 DIAGNOSIS — G89.29 CHRONIC BILATERAL LOW BACK PAIN WITH BILATERAL SCIATICA: Primary | ICD-10-CM

## 2021-09-01 DIAGNOSIS — G89.29 CHRONIC BILATERAL LOW BACK PAIN WITH RIGHT-SIDED SCIATICA: ICD-10-CM

## 2021-09-01 DIAGNOSIS — M54.41 CHRONIC BILATERAL LOW BACK PAIN WITH BILATERAL SCIATICA: Primary | ICD-10-CM

## 2021-09-01 DIAGNOSIS — M54.41 CHRONIC BILATERAL LOW BACK PAIN WITH RIGHT-SIDED SCIATICA: ICD-10-CM

## 2021-09-01 PROCEDURE — 97110 THERAPEUTIC EXERCISES: CPT | Mod: GP | Performed by: PHYSICAL THERAPIST

## 2021-09-01 PROCEDURE — 97162 PT EVAL MOD COMPLEX 30 MIN: CPT | Mod: GP | Performed by: PHYSICAL THERAPIST

## 2021-09-01 NOTE — LETTER
DEPARTMENT OF HEALTH AND HUMAN SERVICES  [facility]    PLAN/UPDATED PLAN OF PROGRESS FOR OUTPATIENT REHABILITATION    PATIENTS NAME:  Taty Carrington   : 1950  PROVIDER NUMBER:    [id number]  [id number]  PROVIDER NAME: [facility] ELIDA CORDOVA  MEDICAL RECORD NUMBER: [id number]   START OF CARE DATE:  SOC Date: 21   TYPE:  PT  PRIMARY/TREATMENT DIAGNOSIS: (Pertinent Medical Diagnosis)     Chronic bilateral low back pain with bilateral sciatica  Chronic bilateral low back pain with right-sided sciatica    VISITS FROM START OF CARE:  Rxs Used: 1     Physical Therapy Initial Evaluation  Subjective:  The history is provided by the patient. No  was used.   Patient Health History  Taty Carrington being seen for Chronic LBP and Acute Right buttock and posterior thigh beginning 2 months ago.   Date of Onset: MD referral 21.   Problem occurred: Unknown   Pain is reported as 7/10 (ranges 5-7/10) on pain scale.  General health as reported by patient is good.  Pertinent medical history includes: overweight, high blood pressure, migraines/headaches and osteoporosis.   Red flags:  None as reported by patient.  Medical allergies: other. Other medical allergies details: keflex.   Surgeries include:  None.    Current medications:  High blood pressure medication and anti-inflammatory.    Current occupation is retired.               Therapist Generated HPI Evaluation  Problem details: Taty reports severe OA B Knees which impacts her function and gait which may be a contributing factor to her LBP and sciatica R LE. Currently not using a cane or any other assistive device. A cane may be beneficial. .         Type of problem:  Lumbar. This is a recurrent condition.  Condition occurred with:  Insidious onset.  Where condition occurred: for unknown reasons.  Patient reports pain:  Lumbar spine right, lumbar spine left and central lumbar  spine.  Pain is described as aching and is constant.  Pain radiates to:  Gluteals right and thigh right. Pain is the same all the time.  Since onset symptoms are gradually worsening.  Associated symptoms:  Numbness. Exacerbated by: sitting limited to 1 hour and standing limited to 30 mins or less.  and relieved by ice (changing positions).  Imaging testing: no imaging.  Previous treatment includes physical therapy (Left Hamstring a couple years ago). There was moderate improvement following previous treatment.  Barriers include:  None as reported by patient.  PATIENTS NAME:  Taty Carrington   : 1950, page 2                  Objective:  Standing Alignment:    Lumbar:  Normal  Pelvic:  Normal  Gait:    Gait Type:  Antalgic   Assistive Devices:  None  Deviations:  General Deviations:  Wendy decr, stance time decr and stride length decr  Non-Weight Bearing:    Pelvis:  Normal  Flexibility/Screens:   Positive screens:  Lumbar  Lower Extremity:  Decreased left lower extremity flexibility:Piriformis and Quadriceps  Decreased right lower extremity flexibility:  Piriformis and Quadriceps   Lumbar/SI Evaluation  AROM Lumbar:   Flexion:          Hands to floor with end range pain w/o change in residual symptoms   Ext:                    WNL with end range LBP and no change in radicular symptoms   Side Bend:        Left:  WNL    Right:  WNL   Rotation:           Left:     Right:   Side Glide:        Left:     Right:      Strength: Fair Core Strength  Lumbar Myotomes:  normal  Lumbar DTR's:  not assessed  Cord Signs:  not assessed  Lumbar Dermtomes:  not assessed  Neural Tension/Mobility:    Left side:Slump  negative.   Right side:   Slump positive.  Lumbar Palpation:    Tenderness present at Left:    Erector Spinae; Piriformis and PSIS  Tenderness present at Right: Erector Spinae; Piriformis and PSIS  Functional Tests:  not assessed  Lumbar Provocation:    Left negative with:  PROM hip  Right negative with:  PROM hip  SI  joint/Sacrum:      Left positive at:    Squish  Right negative at:  Squish    Assessment/Plan:    Patient is a 71 year old female with lumbar complaints.    Patient has the following significant findings with corresponding treatment plan.                Diagnosis 1:  Chronic B LBP with R sciatica   Pain -  US, manual therapy, self management, education, directional preference exercise and home program  Decreased strength - therapeutic exercise, therapeutic activities and home program  Impaired balance - neuro re-education, therapeutic activities and home program  Inflammation - US and self management/home program  Impaired gait - gait training and home program  Impaired muscle performance - neuro re-education and home program  Decreased function - therapeutic activities and home program  PATIENTS NAME:  Taty Carrington   : 1950, page 3    Therapy Evaluation Codes:   1) History comprised of:  Personal factors that impact the plan of care:      Time since onset of symptoms.    Comorbidity factors that impact the plan of care are:  High blood pressure, Migraines/headaches, Overweight and Osteoporosis, B OA Knees.     Medications impacting care: Anti-inflammatory and High blood pressure.  2) Examination of Body Systems comprised of: Body structures and functions that impact the plan of care:  Lumbar spine.   Activity limitations that impact the plan of care are: Sitting and Standing.  3) Clinical presentation characteristics are: Evolving/Changing.  4) Decision-Making: Moderate complexity using standardized patient assessment instrument and/or measureable assessment of functional outcome.  Cumulative Therapy Evaluation is: Moderate complexity.  Previous and current functional limitations:  (See Goal Flow Sheet for this information)    Short term and Long term goals: (See Goal Flow Sheet for this information)   Communication ability:  Patient appears to be able to clearly communicate and understand verbal and  "written communication and follow directions correctly.  Treatment Explanation - The following has been discussed with the patient:   RX ordered/plan of care  Anticipated outcomes  Possible risks and side effects  This patient would benefit from PT intervention to resume normal activities.   Rehab potential is fair.    Frequency:  1 X week, once daily  Duration:  for 6 weeks  Discharge Plan:  Achieve all LTG.  Independent in home treatment program.  Return to previous functional level by discharge.  Reach maximal therapeutic benefit.      Caregiver Signature/Credentials _____________________________ Date ________      Treating Provider: Gely Tejada (Peg), PT   I have reviewed and certified the need for these services and plan of treatment while under my care.        PHYSICIAN'S SIGNATURE:   _________________________________________  Date___________   Kin Ragland MD    Certification period:  Beginning of Cert date period: 09/01/21 to  End of Cert period date: 11/29/21     Functional Level Progress Report: Please see attached \"Goal Flow sheet for Functional level.\"    ____X____ Continue Services or       ________ DC Services                Service dates: From  SOC Date: 09/01/21 date to present                         "

## 2021-09-01 NOTE — PROGRESS NOTES
Physical Therapy Initial Evaluation  Subjective:  The history is provided by the patient. No  was used.   Patient Health History  Taty Carrington being seen for Chronic LBP and Acute Right buttock and posterior thigh beginning 2 months ago.     Date of Onset: MD referral 8/25/21.   Problem occurred: Unknown   Pain is reported as 7/10 (ranges 5-7/10) on pain scale.  General health as reported by patient is good.  Pertinent medical history includes: overweight, high blood pressure, migraines/headaches and osteoporosis.   Red flags:  None as reported by patient.  Medical allergies: other. Other medical allergies details: keflex.   Surgeries include:  None.    Current medications:  High blood pressure medication and anti-inflammatory.    Current occupation is retired.                     Therapist Generated HPI Evaluation  Problem details: Taty reports severe OA B Knees which impacts her function and gait which may be a contributing factor to her LBP and sciatica R LE. Currently not using a cane or any other assistive device. A cane may be beneficial. .         Type of problem:  Lumbar.    This is a recurrent condition.  Condition occurred with:  Insidious onset.  Where condition occurred: for unknown reasons.  Patient reports pain:  Lumbar spine right, lumbar spine left and central lumbar spine.  Pain is described as aching and is constant.  Pain radiates to:  Gluteals right and thigh right. Pain is the same all the time.  Since onset symptoms are gradually worsening.  Associated symptoms:  Numbness. Exacerbated by: sitting limited to 1 hour and standing limited to 30 mins or less.  and relieved by ice (changing positions).  Imaging testing: no imaging.  Previous treatment includes physical therapy (Left Hamstring a couple years ago). There was moderate improvement following previous treatment.  Barriers include:  None as reported by patient.                        Objective:  Standing Alignment:         Lumbar:  Normal  Pelvic:  Normal          Gait:    Gait Type:  Antalgic   Assistive Devices:  None  Deviations:  General Deviations:  Wendy decr, stance time decr and stride length decr  Non-Weight Bearing:    Pelvis:  Normal        Flexibility/Screens:   Positive screens:  Lumbar    Lower Extremity:  Decreased left lower extremity flexibility:Piriformis and Quadriceps    Decreased right lower extremity flexibility:  Piriformis and Quadriceps               Lumbar/SI Evaluation  ROM:    AROM Lumbar:   Flexion:          Hands to floor with end range pain w/o change in residual symptoms   Ext:                    WNL with end range LBP and no change in radicular symptoms   Side Bend:        Left:  WNL    Right:  WNL   Rotation:           Left:     Right:   Side Glide:        Left:     Right:         Strength: Fair Core Strength  Lumbar Myotomes:  normal            Lumbar DTR's:  not assessed      Cord Signs:  not assessed    Lumbar Dermtomes:  not assessed                Neural Tension/Mobility:      Left side:Slump  negative.   Right side:   Slump positive.  Lumbar Palpation:    Tenderness present at Left:    Erector Spinae; Piriformis and PSIS  Tenderness present at Right: Erector Spinae; Piriformis and PSIS  Functional Tests:  not assessed        Lumbar Provocation:      Left negative with:  PROM hip    Right negative with:  PROM hip    SI joint/Sacrum:        Left positive at:    Squish    Right negative at:  Squish                                                 General     ROS    Assessment/Plan:    Patient is a 71 year old female with lumbar complaints.    Patient has the following significant findings with corresponding treatment plan.                Diagnosis 1:  Chronic B LBP with R sciatica   Pain -  US, manual therapy, self management, education, directional preference exercise and home program  Decreased strength - therapeutic exercise, therapeutic activities and home program  Impaired balance -  neuro re-education, therapeutic activities and home program  Inflammation - US and self management/home program  Impaired gait - gait training and home program  Impaired muscle performance - neuro re-education and home program  Decreased function - therapeutic activities and home program    Therapy Evaluation Codes:   1) History comprised of:   Personal factors that impact the plan of care:      Time since onset of symptoms.    Comorbidity factors that impact the plan of care are:      High blood pressure, Migraines/headaches, Overweight and Osteoporosis, B OA Knees.     Medications impacting care: Anti-inflammatory and High blood pressure.  2) Examination of Body Systems comprised of:   Body structures and functions that impact the plan of care:      Lumbar spine.   Activity limitations that impact the plan of care are:      Sitting and Standing.  3) Clinical presentation characteristics are:   Evolving/Changing.  4) Decision-Making    Moderate complexity using standardized patient assessment instrument and/or measureable assessment of functional outcome.  Cumulative Therapy Evaluation is: Moderate complexity.    Previous and current functional limitations:  (See Goal Flow Sheet for this information)    Short term and Long term goals: (See Goal Flow Sheet for this information)     Communication ability:  Patient appears to be able to clearly communicate and understand verbal and written communication and follow directions correctly.  Treatment Explanation - The following has been discussed with the patient:   RX ordered/plan of care  Anticipated outcomes  Possible risks and side effects  This patient would benefit from PT intervention to resume normal activities.   Rehab potential is fair.    Frequency:  1 X week, once daily  Duration:  for 6 weeks  Discharge Plan:  Achieve all LTG.  Independent in home treatment program.  Return to previous functional level by discharge.  Reach maximal therapeutic  benefit.    Please refer to the daily flowsheet for treatment today, total treatment time and time spent performing 1:1 timed codes.

## 2021-09-08 ENCOUNTER — THERAPY VISIT (OUTPATIENT)
Dept: PHYSICAL THERAPY | Facility: CLINIC | Age: 71
End: 2021-09-08
Payer: MEDICARE

## 2021-09-08 DIAGNOSIS — M54.41 CHRONIC BILATERAL LOW BACK PAIN WITH RIGHT-SIDED SCIATICA: Primary | ICD-10-CM

## 2021-09-08 DIAGNOSIS — G89.29 CHRONIC BILATERAL LOW BACK PAIN WITH RIGHT-SIDED SCIATICA: Primary | ICD-10-CM

## 2021-09-08 PROCEDURE — 97035 APP MDLTY 1+ULTRASOUND EA 15: CPT | Mod: GP | Performed by: PHYSICAL THERAPIST

## 2021-09-08 PROCEDURE — 97110 THERAPEUTIC EXERCISES: CPT | Mod: GP | Performed by: PHYSICAL THERAPIST

## 2021-09-15 ENCOUNTER — THERAPY VISIT (OUTPATIENT)
Dept: PHYSICAL THERAPY | Facility: CLINIC | Age: 71
End: 2021-09-15
Payer: MEDICARE

## 2021-09-15 DIAGNOSIS — G89.29 CHRONIC BILATERAL LOW BACK PAIN WITH RIGHT-SIDED SCIATICA: Primary | ICD-10-CM

## 2021-09-15 DIAGNOSIS — M54.41 CHRONIC BILATERAL LOW BACK PAIN WITH RIGHT-SIDED SCIATICA: Primary | ICD-10-CM

## 2021-09-15 PROCEDURE — 97110 THERAPEUTIC EXERCISES: CPT | Mod: GP | Performed by: PHYSICAL THERAPIST

## 2021-09-15 PROCEDURE — 97035 APP MDLTY 1+ULTRASOUND EA 15: CPT | Mod: GP | Performed by: PHYSICAL THERAPIST

## 2021-09-15 PROCEDURE — 97112 NEUROMUSCULAR REEDUCATION: CPT | Mod: GP | Performed by: PHYSICAL THERAPIST

## 2021-09-22 ENCOUNTER — THERAPY VISIT (OUTPATIENT)
Dept: PHYSICAL THERAPY | Facility: CLINIC | Age: 71
End: 2021-09-22
Payer: MEDICARE

## 2021-09-22 DIAGNOSIS — G89.29 CHRONIC BILATERAL LOW BACK PAIN WITH RIGHT-SIDED SCIATICA: Primary | ICD-10-CM

## 2021-09-22 DIAGNOSIS — M54.41 CHRONIC BILATERAL LOW BACK PAIN WITH RIGHT-SIDED SCIATICA: Primary | ICD-10-CM

## 2021-09-22 PROCEDURE — 97140 MANUAL THERAPY 1/> REGIONS: CPT | Mod: GP | Performed by: PHYSICAL THERAPIST

## 2021-09-22 PROCEDURE — 97035 APP MDLTY 1+ULTRASOUND EA 15: CPT | Mod: GP | Performed by: PHYSICAL THERAPIST

## 2021-09-22 PROCEDURE — 97110 THERAPEUTIC EXERCISES: CPT | Mod: GP | Performed by: PHYSICAL THERAPIST

## 2021-10-06 ENCOUNTER — THERAPY VISIT (OUTPATIENT)
Dept: PHYSICAL THERAPY | Facility: CLINIC | Age: 71
End: 2021-10-06
Payer: MEDICARE

## 2021-10-06 DIAGNOSIS — G89.29 CHRONIC BILATERAL LOW BACK PAIN WITH RIGHT-SIDED SCIATICA: Primary | ICD-10-CM

## 2021-10-06 DIAGNOSIS — M54.41 CHRONIC BILATERAL LOW BACK PAIN WITH RIGHT-SIDED SCIATICA: Primary | ICD-10-CM

## 2021-10-06 PROCEDURE — 97035 APP MDLTY 1+ULTRASOUND EA 15: CPT | Mod: GP | Performed by: PHYSICAL THERAPIST

## 2021-10-06 PROCEDURE — 97140 MANUAL THERAPY 1/> REGIONS: CPT | Mod: GP | Performed by: PHYSICAL THERAPIST

## 2021-10-06 PROCEDURE — 97110 THERAPEUTIC EXERCISES: CPT | Mod: GP | Performed by: PHYSICAL THERAPIST

## 2021-10-06 NOTE — PROGRESS NOTES
Subjective:  HPI  Physical Exam  Oswestry Score: 15.56 %                 Objective:  System    Physical Exam    General     ROS    Assessment/Plan:    DISCHARGE REPORT    Progress reporting period is from 9-1-21 to 10-6-21.       SUBJECTIVE  Subjective changes noted by patient:   Currently, Taty's cheif c/o B Knee pain. Plans to see ortho for further evaluation and possible gel injections. Taty reports that LBP and R glut muscle feeling a lot better. Sitting for 1 hour in cushioned and supportive w/o pain.     Current Pain level: 0/10.     Initial Pain level: 6/10.   Changes in function:  Yes (See Goal flowsheet attached for changes in current functional level)  Adverse reaction to treatment or activity: None    OBJECTIVE  Changes noted in objective findings:   Mild TTP reported with palpation R piriformis. Improved core strength and body mechanics. Taty is confident in HEP and managing her symptoms independently.     ASSESSMENT/PLAN  Updated problem list and treatment plan: Diagnosis 1:  LBP and Glut pain R  Pain -  hot/cold therapy, US, manual therapy, self management, education and home program  Decreased ROM/flexibility - manual therapy, therapeutic exercise and home program  Decreased joint mobility - manual therapy, therapeutic exercise and home program  Decreased strength - therapeutic exercise, therapeutic activities and home program  Inflammation - US and self management/home program  Impaired muscle performance - neuro re-education and home program  Decreased function - therapeutic activities and home program  STG/LTGs have been met or progress has been made towards goals:  Yes (See Goal flow sheet completed today.)  Assessment of Progress: The patient's condition is improving.  The patient has met all of their long term goals.  Self Management Plans:  Patient is independent in a home treatment program.  Patient is independent in self management of symptoms.  I have re-evaluated this patient and find  that the nature, scope, duration and intensity of the therapy is appropriate for the medical condition of the patient.  Taty continues to require the following intervention to meet STG and LTG's:  PT intervention is no longer required to meet STG/LTG.    Recommendations:  This patient is ready to be discharged from therapy and continue their home treatment program.    Please refer to the daily flowsheet for treatment today, total treatment time and time spent performing 1:1 timed codes.

## 2021-10-10 ENCOUNTER — HEALTH MAINTENANCE LETTER (OUTPATIENT)
Age: 71
End: 2021-10-10

## 2022-01-30 ENCOUNTER — HEALTH MAINTENANCE LETTER (OUTPATIENT)
Age: 72
End: 2022-01-30

## 2022-03-02 ENCOUNTER — TRANSCRIBE ORDERS (OUTPATIENT)
Dept: LAB | Facility: CLINIC | Age: 72
End: 2022-03-02
Payer: MEDICARE

## 2022-03-02 DIAGNOSIS — M54.31 BILATERAL SCIATICA: Primary | ICD-10-CM

## 2022-03-02 DIAGNOSIS — M54.32 BILATERAL SCIATICA: Primary | ICD-10-CM

## 2022-03-08 ENCOUNTER — THERAPY VISIT (OUTPATIENT)
Dept: PHYSICAL THERAPY | Facility: CLINIC | Age: 72
End: 2022-03-08
Payer: MEDICARE

## 2022-03-08 DIAGNOSIS — M54.32 BILATERAL SCIATICA: ICD-10-CM

## 2022-03-08 DIAGNOSIS — M54.31 BILATERAL SCIATICA: ICD-10-CM

## 2022-03-08 DIAGNOSIS — M54.16 LUMBAR RADICULOPATHY: ICD-10-CM

## 2022-03-08 PROCEDURE — 97110 THERAPEUTIC EXERCISES: CPT | Mod: GP | Performed by: PHYSICAL THERAPIST

## 2022-03-08 PROCEDURE — 97140 MANUAL THERAPY 1/> REGIONS: CPT | Mod: GP | Performed by: PHYSICAL THERAPIST

## 2022-03-08 PROCEDURE — 97161 PT EVAL LOW COMPLEX 20 MIN: CPT | Mod: GP | Performed by: PHYSICAL THERAPIST

## 2022-03-08 NOTE — LETTER
[facility]  [address]  [place] [address] [phone]    2022  Re: Taty Carrington   :   1950  MRN:  [id number]   REFERRING PHYSICIAN:   MD GUSTABO [facility]  Date of Initial Evaluation:  3/08/22  Visits:  Rxs Used: 1  Reason for Referral:     Bilateral sciatica  Lumbar radiculopathy    EVALUATION SUMMARY    Physical Therapy Initial Evaluation  Subjective:  The history is provided by the patient. No  was used.   Patient Health History  Taty Carrington being seen for LBP and Legs & Feet feel numb when standing.   Date of Onset: MD referral 3/2/22.   Problem occurred: 2 months ago for unknown reasons   Pain is reported as 7/10 (Ranges 4-7/10) on pain scale.  General health as reported by patient is good.  Pertinent medical history includes: high blood pressure, overweight, osteoporosis, numbness/tingling and migraines/headaches.   Red flags:  None as reported by patient.  Medical allergies: other. Other medical allergies details: keflex.   Surgeries include:  None.    Current medications:  High blood pressure medication.    Current occupation is retired.               Therapist Generated HPI Evaluation      Type of problem:  Lumbar.  This is a recurrent condition.  Condition occurred with:  Insidious onset.  Where condition occurred: for unknown reasons.  Patient reports pain:  Central lumbar spine.  Pain is described as aching and is constant.  Pain radiates to:  Thigh right, lower leg left, foot right and foot left. Pain same all the time.  Since onset symptoms are unchanged.  Associated symptoms:  Loss of motion/stiffness, numbness and tingling. Exacerbated by: standing > 5-10 mins and sitting > 15 mins.  and relieved by rest and NSAID's.  Imaging testing: no recent imaging.  Previous treatment includes physical therapy (LBP 10/21). There was moderate  improvement following previous treatment.  Barriers include:  None as reported by patient.              Objective:  Standing Alignment:    Lumbar:  Normal  Re: Taty Carrington. :   1950, page 2    Knee:  Genu varus R and genu varus L  Gait:    Gait Type:  Antalgic   Assistive Devices:  None  Non-Weight Bearing:  normal     Flexibility/Screens:   Positive screens:  Lumbar  Lower Extremity:  Normal         Lumbar/SI Evaluation  ROM:    AROM Lumbar:   Flexion:          WNL no change  Ext:                    WNL no change   Side Bend:        Left:  WNL    Right:  WNL  Rotation:           Left:     Right:   Side Glide:        Left:     Right:      Strength: Fair Core Strength  Lumbar Myotomes:  normal  Lumbar DTR's:  not assessed  Cord Signs:  not assessed  Lumbar Dermtomes:  not assessed  Neural Tension/Mobility:  Lumbar:  Normal    Lumbar Palpation:    Tenderness present at Left:    Erector Spinae  Tenderness present at Right: Erector Spinae  Functional Tests:  not assessed  Lumbar Provocation:    Left negative with:  PROM hip  Right negative with:  PROM hip  SI joint/Sacrum:      Left negative at:    Squish  Right negative at:  Squish    General Evaluation:  Lower Extremity Flexibility:  normal    Assessment/Plan:    Patient is a 71 year old female with lumbar complaints.    Patient has the following significant findings with corresponding treatment plan.                Diagnosis 1:  Central LBP with radiculopathy.  Pain -  manual therapy, self management, education and home program  Decreased strength - therapeutic exercise, therapeutic activities and home program  Inflammation - self management/home program  Impaired gait - gait training and home program  Impaired muscle performance - neuro re-education and home program  Decreased function - therapeutic activities and home program    Therapy Evaluation Codes:   1) History comprised of:  Personal factors that impact the plan of care:      Time since onset of  symptoms.     Re: Taty Carrington. :   1950, page 3     Comorbidity factors impact Plan of care: High blood pressure, Migraines/headaches, Numbness/tingling and Overweight.     Medications impacting care: High blood pressure.  2) Examination of Body Systems comprised of: Body structures and functions that impact the plan of care:  Lumbar spine.   Activity limitations that impact the plan of care are: Standing.  3) Clinical presentation characteristics are: Stable/Uncomplicated.  4) Decision-Making: Low complexity using standardized patient assessment instrument and/or measureable assessment of functional outcome.  Cumulative Therapy Evaluation is: Low complexity.  Previous and current functional limitations:  (See Goal Flow Sheet for this information)    Short term and Long term goals: (See Goal Flow Sheet for this information)   Communication ability:  Patient appears to be able to clearly communicate and understand verbal and written communication and follow directions correctly.  Treatment Explanation - The following has been discussed with the patient:   RX ordered/plan of care. Anticipated outcomes. Possible risks and side effects.  This patient would benefit from PT intervention to resume normal activities.   Rehab potential is good.    Frequency:  1 X week, once daily  Duration:  for 6 weeks  Discharge Plan:  Achieve all LTG.  Independent in home treatment program.  Return to previous functional level by discharge.  Reach maximal therapeutic benefit.                                                  Re: Taty Carrington. :   1950, page 4                                                                               [facility]    OUTPATIENT Physical Therapy ORTHOPEDIC EVALUATION  PLAN OF TREATMENT FOR OUTPATIENT REHABILITATION  (COMPLETE FOR INITIAL CLAIMS ONLY)  Patient's Last Name, First Name, M.I.  YOB: 1950  Taty Carrington    Provider s Name:  GUSTABO  [facility]   Medical Record No.  [id number]   Start of Care Date:  22   Onset Date:   22 (MD referral)   Type:     _X__PT   ___OT Medical Diagnosis:    Encounter Diagnoses   Name Primary?     Bilateral sciatica      Lumbar radiculopathy         Treatment Diagnosis:                                                                 Re: Taty Carrington. :   1950, page 5    Goals:     22 0500   Body Part   Goals listed below are for LBP   Goal #1   Goal #1 standing   Previous Functional Level No restrictions   Current Functional Level Minutes patient can stand   Performance level 5 with foot numbess    STG Target Performance Minutes patient will be able to stand   Performance level 10 w/o foot numbness   Rationale for housekeeping tasks such as vacuuming, bed making, mowing, gardening;for personal hygiene;for meal preparation;for safe household ambulation;for safe community ambulation   Due date 22   LTG Target Performance Minutes patient will be able to stand   Performance Level 15 w/o foot numbness   Rationale for housekeeping tasks such as vacuuming, bed making, mowing;for personal hygiene;for meal preparation;for safe household ambulation;for safe community ambulation   Due date 22       Therapy Frequency:  1 x week   Predicted Duration of Therapy Intervention:  6 weeks    Gely Tejada, JEANETTE                 Re: Taty Carrington. :   1950, page 6            I CERTIFY THE NEED FOR THESE SERVICES FURNISHED UNDER        THIS PLAN OF TREATMENT AND WHILE UNDER MY CARE .           Physician Signature               Date    X_____________________________________________________                         Certification Date From:  22   Certification Date To:  22    Referring Provider:  Kin Ragland MD    Initial Assessment        See Epic Evaluation SOC Date: 22                                                          Thank you  for your referral.    INQUIRIES  Therapist: Gely Tejada PT   [address]  [facility] [address]  Phone: [phone]  Fax: [phone]

## 2022-03-08 NOTE — PROGRESS NOTES
Physical Therapy Initial Evaluation  Subjective:  The history is provided by the patient. No  was used.   Patient Health History  Taty Carrington being seen for LBP and Legs & Feet feel numb when standing.     Date of Onset: MD referral 3/2/22.   Problem occurred: 2 months ago for unknown reasons   Pain is reported as 7/10 (Ranges 4-7/10) on pain scale.  General health as reported by patient is good.  Pertinent medical history includes: high blood pressure, overweight, osteoporosis, numbness/tingling and migraines/headaches.   Red flags:  None as reported by patient.  Medical allergies: other. Other medical allergies details: keflex.   Surgeries include:  None.    Current medications:  High blood pressure medication.    Current occupation is retired.                     Therapist Generated HPI Evaluation         Type of problem:  Lumbar.    This is a recurrent condition.  Condition occurred with:  Insidious onset.  Where condition occurred: for unknown reasons.  Patient reports pain:  Central lumbar spine.  Pain is described as aching and is constant.  Pain radiates to:  Thigh right, lower leg left, foot right and foot left. Pain is the same all the time.  Since onset symptoms are unchanged.  Associated symptoms:  Loss of motion/stiffness, numbness and tingling. Exacerbated by: standing > 5-10 mins and sitting > 15 mins.  and relieved by rest and NSAID's.  Imaging testing: no recent imaging.  Previous treatment includes physical therapy (LBP 10/21). There was moderate improvement following previous treatment.  Barriers include:  None as reported by patient.                        Objective:  Standing Alignment:        Lumbar:  Normal      Knee:  Genu varus R and genu varus L      Gait:    Gait Type:  Antalgic   Assistive Devices:  None    Non-Weight Bearing:  normal             Flexibility/Screens:   Positive screens:  Lumbar        Lower Extremity:  Normal             Lumbar/SI Evaluation  ROM:     AROM Lumbar:   Flexion:          WNL no change  Ext:                    WNL no change   Side Bend:        Left:  WNL    Right:  WNL  Rotation:           Left:     Right:   Side Glide:        Left:     Right:         Strength: Fair Core Strength  Lumbar Myotomes:  normal            Lumbar DTR's:  not assessed      Cord Signs:  not assessed    Lumbar Dermtomes:  not assessed                Neural Tension/Mobility:  Lumbar:  Normal        Lumbar Palpation:    Tenderness present at Left:    Erector Spinae  Tenderness present at Right: Erector Spinae  Functional Tests:  not assessed        Lumbar Provocation:      Left negative with:  PROM hip    Right negative with:  PROM hip    SI joint/Sacrum:          Left negative at:    Squish    Right negative at:  Squish                                                     General Evaluation:          Lower Extremity Flexibility:  normal                                                                             ROS    Assessment/Plan:    Patient is a 71 year old female with lumbar complaints.    Patient has the following significant findings with corresponding treatment plan.                Diagnosis 1:  Central LBP with radiculopathy   Pain -  manual therapy, self management, education and home program  Decreased strength - therapeutic exercise, therapeutic activities and home program  Inflammation - self management/home program  Impaired gait - gait training and home program  Impaired muscle performance - neuro re-education and home program  Decreased function - therapeutic activities and home program    Therapy Evaluation Codes:   1) History comprised of:   Personal factors that impact the plan of care:      Time since onset of symptoms.    Comorbidity factors that impact the plan of care are:      High blood pressure, Migraines/headaches, Numbness/tingling and Overweight.     Medications impacting care: High blood pressure.  2) Examination of Body Systems comprised  of:   Body structures and functions that impact the plan of care:      Lumbar spine.   Activity limitations that impact the plan of care are:      Standing.  3) Clinical presentation characteristics are:   Stable/Uncomplicated.  4) Decision-Making    Low complexity using standardized patient assessment instrument and/or measureable assessment of functional outcome.  Cumulative Therapy Evaluation is: Low complexity.    Previous and current functional limitations:  (See Goal Flow Sheet for this information)    Short term and Long term goals: (See Goal Flow Sheet for this information)     Communication ability:  Patient appears to be able to clearly communicate and understand verbal and written communication and follow directions correctly.  Treatment Explanation - The following has been discussed with the patient:   RX ordered/plan of care  Anticipated outcomes  Possible risks and side effects  This patient would benefit from PT intervention to resume normal activities.   Rehab potential is good.    Frequency:  1 X week, once daily  Duration:  for 6 weeks  Discharge Plan:  Achieve all LTG.  Independent in home treatment program.  Return to previous functional level by discharge.  Reach maximal therapeutic benefit.    Please refer to the daily flowsheet for treatment today, total treatment time and time spent performing 1:1 timed codes.

## 2022-03-08 NOTE — LETTER
DEPARTMENT OF HEALTH AND HUMAN SERVICES  [facility]    PLAN/UPDATED PLAN OF PROGRESS FOR OUTPATIENT REHABILITATION    PATIENTS NAME:  Taty Carrington   : 1950  PROVIDER NUMBER:    [id number]  [id number]  PROVIDER NAME: [facility] [place]  MEDICAL RECORD NUMBER: [id number]   START OF CARE DATE:  SOC Date: 22   TYPE:  PT  PRIMARY/TREATMENT DIAGNOSIS: (Pertinent Medical Diagnosis)     Bilateral sciatica  Lumbar radiculopathy    VISITS FROM START OF CARE:  Rxs Used: 1   Physical Therapy Initial Evaluation  Subjective:  The history is provided by the patient. No  was used.   Patient Health History  Taty Carrington being seen for LBP and Legs & Feet feel numb when standing.   Date of Onset: MD referral 3/2/22.   Problem occurred: 2 months ago for unknown reasons   Pain is reported as 7/10 (Ranges 4-7/10) on pain scale.  General health as reported by patient is good.  Pertinent medical history includes: high blood pressure, overweight, osteoporosis, numbness/tingling and migraines/headaches.   Red flags:  None as reported by patient.  Medical allergies: other. Other medical allergies details: keflex.   Surgeries include:  None.    Current medications:  High blood pressure medication.    Current occupation is retired.                 Therapist Generated HPI Evaluation     Type of problem:  Lumbar.  This is a recurrent condition.  Condition occurred with:  Insidious onset.  Where condition occurred: for unknown reasons.  Patient reports pain:  Central lumbar spine.  Pain is described as aching and is constant.  Pain radiates to:  Thigh right, lower leg left, foot right and foot left. Pain is the same all the time.  Since onset symptoms are unchanged.  Associated symptoms:  Loss of motion/stiffness, numbness and tingling. Exacerbated by: standing > 5-10 mins and sitting > 15 mins.  and relieved by rest and NSAID's.  Imaging  testing: no recent imaging.  Previous treatment includes physical therapy (LBP 10/21). There was moderate improvement following previous treatment.  Barriers include:  None as reported by patient             Objective:  Standing Alignment:    Lumbar:  Normal  PATIENTS NAME:  Taty Carrington. : 1950, page 2    Knee:  Genu varus R and genu varus L  Gait:    Gait Type:  Antalgic   Assistive Devices:  None  Non-Weight Bearing:  normal     Flexibility/Screens:   Positive screens:  Lumbar  Lower Extremity:  Normal      Lumbar/SI Evaluation  ROM:    AROM Lumbar:   Flexion:          WNL no change  Ext:                    WNL no change   Side Bend:        Left:  WNL    Right:  WNL  Rotation:           Left:     Right:   Side Glide:        Left:     Right:      Strength: Fair Core Strength  Lumbar Myotomes:  normal  Lumbar DTR's:  not assessed  Cord Signs:  not assessed  Lumbar Dermtomes:  not assessed  Neural Tension/Mobility:  Lumbar:  Normal    Lumbar Palpation:    Tenderness present at Left:    Erector Spinae  Tenderness present at Right: Erector Spinae  Functional Tests:  not assessed  Lumbar Provocation:    Left negative with:  PROM hip  Right negative with:  PROM hip  SI joint/Sacrum:      Left negative at:    Squish  Right negative at:  Squish    General Evaluation:  Lower Extremity Flexibility:  normal                               Assessment/Plan:    Patient is a 71 year old female with lumbar complaints.    Patient has the following significant findings with corresponding treatment plan.                Diagnosis 1:  Central LBP with radiculopathy.  Pain -  manual therapy, self management, education and home program  Decreased strength - therapeutic exercise, therapeutic activities and home program  Inflammation - self management/home program  Impaired gait - gait training and home program  Impaired muscle performance - neuro re-education and home program  Decreased function - therapeutic activities and home  program    Therapy Evaluation Codes:   1) History comprised of:  Personal factors that impact the plan of care: Time since onset of symptoms.    Comorbidity factors that impact the plan of care are: High blood pressure, Migraines/headaches, Numbness/tingling and Overweight.    PATIENTS NAME:  Taty Carrington. : 1950, page 3     Medications impacting care: High blood pressure.  2) Examination of Body Systems comprised of: Body structures and functions that impact the plan of care:  Lumbar spine.   Activity limitations that impact the plan of care are:  Standing.  3) Clinical presentation characteristics are:Stable/Uncomplicated.  4) Decision-Making: Low complexity using standardized patient assessment instrument and/or measureable assessment of functional outcome.  Cumulative Therapy Evaluation is: Low complexity.  Previous and current functional limitations:  (See Goal Flow Sheet for this information)    Short term and Long term goals: (See Goal Flow Sheet for this information)   Communication ability:  Patient appears to be able to clearly communicate and understand verbal and written communication and follow directions correctly.  Treatment Explanation - The following has been discussed with the patient:   RX ordered/plan of care. Anticipated outcomes. Possible risks and side effects.  This patient would benefit from PT intervention to resume normal activities.   Rehab potential is good.    Frequency:  1 X week, once daily  Duration:  for 6 weeks  Discharge Plan:  Achieve all LTG.  Independent in home treatment program.  Return to previous functional level by discharge.  Reach maximal therapeutic benefit.                                                                                 [facility]    OUTPATIENT Physical Therapy ORTHOPEDIC EVALUATION  PLAN OF TREATMENT FOR OUTPATIENT REHABILITATION  (COMPLETE FOR INITIAL CLAIMS ONLY)  Patient's Last Name, First Name, M.I.  Date of  Birth:  1950  Taty Carrington    Provider s Name:  [facility]   Medical Record No.  [id number]   Start of Care Date:  22   Onset Date:   22 (MD referral)   Type:     _X__PT   ___OT Medical Diagnosis:    Encounter Diagnoses   Name Primary?     Bilateral sciatica      Lumbar radiculopathy         Treatment Diagnosis:         PATIENTS NAME:  aTty Carrington, : 1950, page 4      Goals:     22 0500   Body Part   Goals listed below are for LBP   Goal #1   Goal #1 standing   Previous Functional Level No restrictions   Current Functional Level Minutes patient can stand   Performance level 5 with foot numbess    STG Target Performance Minutes patient will be able to stand   Performance level 10 w/o foot numbness   Rationale for housekeeping tasks such as vacuuming, bed making, mowing, gardening;for personal hygiene;for meal preparation;for safe household ambulation;for safe community ambulation   Due date 22   LTG Target Performance Minutes patient will be able to stand   Performance Level 15 w/o foot numbness   Rationale for housekeeping tasks such as vacuuming, bed making, mowing;for personal hygiene;for meal preparation;for safe household ambulation;for safe community ambulation   Due date 22       Therapy Frequency:  1 x week   Predicted Duration of Therapy Intervention:  6 weeks    Gely Tejada, PT      PATIENTS NAME:  Taty Carrington : 1950 page5                       I CERTIFY THE NEED FOR THESE SERVICES FURNISHED UNDER        THIS PLAN OF TREATMENT AND WHILE UNDER MY CARE .             Physician Signature               Date    X_____________________________________________________                             Certification Date From:  22   Certification Date To:  22    Referring Provider:  Kin Ragland MD    Initial Assessment        See Epic Evaluation SOC Date: 22                                               "                  Caregiver Signature/Credentials _____________________________ Date ________      Treating Provider: Gely Tejada PT (Peg)   I have reviewed and certified the need for these services and plan of treatment while under my care.        PHYSICIAN'S SIGNATURE:   _________________________________________  Date___________   Kin Ragland MD  Certification period:  Beginning of Cert date period: 03/08/22 to  End of Cert period date: 06/05/22     Functional Level Progress Report: Please see attached \"Goal Flow sheet for Functional level.\"    ____X____ Continue Services or       ________ DC Services                Service dates: From  SOC Date: 03/08/22 date to present                         "

## 2022-03-08 NOTE — PROGRESS NOTES
[facility]    OUTPATIENT Physical Therapy ORTHOPEDIC EVALUATION  PLAN OF TREATMENT FOR OUTPATIENT REHABILITATION  (COMPLETE FOR INITIAL CLAIMS ONLY)  Patient's Last Name, First Name, M.I.  YOB: 1950  Taty Carrington    Provider s Name:  [facility]   Medical Record No.  [id number]   Start of Care Date:  03/08/22   Onset Date:   03/02/22 (MD referral)   Type:     _X__PT   ___OT Medical Diagnosis:    Encounter Diagnoses   Name Primary?     Bilateral sciatica      Lumbar radiculopathy         Treatment Diagnosis:           Goals:     03/08/22 0500   Body Part   Goals listed below are for LBP   Goal #1   Goal #1 standing   Previous Functional Level No restrictions   Current Functional Level Minutes patient can stand   Performance level 5 with foot numbess    STG Target Performance Minutes patient will be able to stand   Performance level 10 w/o foot numbness   Rationale for housekeeping tasks such as vacuuming, bed making, mowing, gardening;for personal hygiene;for meal preparation;for safe household ambulation;for safe community ambulation   Due date 03/29/22   LTG Target Performance Minutes patient will be able to stand   Performance Level 15 w/o foot numbness   Rationale for housekeeping tasks such as vacuuming, bed making, mowing;for personal hygiene;for meal preparation;for safe household ambulation;for safe community ambulation   Due date 04/19/22       Therapy Frequency:  1 x week   Predicted Duration of Therapy Intervention:  6 weeks    Gely Tjeada, PT                 I CERTIFY THE NEED FOR THESE SERVICES FURNISHED UNDER        THIS PLAN OF TREATMENT AND WHILE UNDER MY CARE .             Physician Signature               Date    X_____________________________________________________                             Certification Date From:  03/08/22    Certification Date To:  06/05/22    Referring Provider:  Kin Ragland    Initial Assessment        See Epic Evaluation SOC Date: 03/08/22

## 2022-03-15 ENCOUNTER — THERAPY VISIT (OUTPATIENT)
Dept: PHYSICAL THERAPY | Facility: CLINIC | Age: 72
End: 2022-03-15
Payer: MEDICARE

## 2022-03-15 DIAGNOSIS — M54.16 LUMBAR RADICULOPATHY: ICD-10-CM

## 2022-03-15 PROCEDURE — 97140 MANUAL THERAPY 1/> REGIONS: CPT | Mod: GP | Performed by: PHYSICAL THERAPIST

## 2022-03-15 PROCEDURE — 97112 NEUROMUSCULAR REEDUCATION: CPT | Mod: GP | Performed by: PHYSICAL THERAPIST

## 2022-03-15 PROCEDURE — 97110 THERAPEUTIC EXERCISES: CPT | Mod: GP | Performed by: PHYSICAL THERAPIST

## 2022-03-22 ENCOUNTER — THERAPY VISIT (OUTPATIENT)
Dept: PHYSICAL THERAPY | Facility: CLINIC | Age: 72
End: 2022-03-22
Payer: MEDICARE

## 2022-03-22 DIAGNOSIS — M54.16 LUMBAR RADICULOPATHY: ICD-10-CM

## 2022-03-22 PROCEDURE — 97140 MANUAL THERAPY 1/> REGIONS: CPT | Mod: GP | Performed by: PHYSICAL THERAPIST

## 2022-03-22 PROCEDURE — 97035 APP MDLTY 1+ULTRASOUND EA 15: CPT | Mod: GP | Performed by: PHYSICAL THERAPIST

## 2022-03-29 ENCOUNTER — THERAPY VISIT (OUTPATIENT)
Dept: PHYSICAL THERAPY | Facility: CLINIC | Age: 72
End: 2022-03-29
Payer: MEDICARE

## 2022-03-29 DIAGNOSIS — M54.16 LUMBAR RADICULOPATHY: ICD-10-CM

## 2022-03-29 PROCEDURE — 97110 THERAPEUTIC EXERCISES: CPT | Mod: GP | Performed by: PHYSICAL THERAPIST

## 2022-03-29 PROCEDURE — 97035 APP MDLTY 1+ULTRASOUND EA 15: CPT | Mod: GP | Performed by: PHYSICAL THERAPIST

## 2022-03-29 PROCEDURE — 97140 MANUAL THERAPY 1/> REGIONS: CPT | Mod: GP | Performed by: PHYSICAL THERAPIST

## 2022-04-05 ENCOUNTER — THERAPY VISIT (OUTPATIENT)
Dept: PHYSICAL THERAPY | Facility: CLINIC | Age: 72
End: 2022-04-05
Payer: MEDICARE

## 2022-04-05 DIAGNOSIS — M54.16 LUMBAR RADICULOPATHY: ICD-10-CM

## 2022-04-05 PROCEDURE — 97140 MANUAL THERAPY 1/> REGIONS: CPT | Mod: GP | Performed by: PHYSICAL THERAPIST

## 2022-04-05 PROCEDURE — 97110 THERAPEUTIC EXERCISES: CPT | Mod: GP | Performed by: PHYSICAL THERAPIST

## 2022-04-05 PROCEDURE — 97035 APP MDLTY 1+ULTRASOUND EA 15: CPT | Mod: GP | Performed by: PHYSICAL THERAPIST

## 2022-04-12 ENCOUNTER — THERAPY VISIT (OUTPATIENT)
Dept: PHYSICAL THERAPY | Facility: CLINIC | Age: 72
End: 2022-04-12
Payer: MEDICARE

## 2022-04-12 DIAGNOSIS — M54.16 LUMBAR RADICULOPATHY: Primary | ICD-10-CM

## 2022-04-12 PROCEDURE — 97140 MANUAL THERAPY 1/> REGIONS: CPT | Mod: GP | Performed by: PHYSICAL THERAPIST

## 2022-04-12 PROCEDURE — 97110 THERAPEUTIC EXERCISES: CPT | Mod: GP | Performed by: PHYSICAL THERAPIST

## 2022-04-12 NOTE — LETTER
[facility]  [address]  [facility] [address]  [phone]    2022  Re: Taty Carrington   :   1950  MRN:  [id number]   REFERRING PHYSICIAN:   MD GUSTABO Cruz [facility]  Date of Initial Evaluation:  22  Visits:  Rxs Used: 6  Reason for Referral:  Lumbar radiculopathy    EVALUATION SUMMARY  HPI  Physical Exam  Oswestry Score: 17.78 %                   DISCHARGE REPORT  Progress reporting period is from 3/8/22 to 22.     SUBJECTIVE  Subjective changes noted by patient:    Taty reports improvement with intermittant vs constant LBP. Able to stand 30 mins w/o numbness in right foot. Using back brace PRN for external support. HEP going well.    Current Pain level: 2/10.     Initial Pain level: 5/10.   Changes in function:  Yes (See Goal flowsheet attached for changes in current functional level)  Adverse reaction to treatment or activity: None  OBJECTIVE  Changes noted in objective findings:    Full painfree trunk ROM. Antalgic gait due to knee pain. Pursuing steroid injections for knees which may help back if gait improves.   ASSESSMENT/PLAN  Updated problem list and treatment plan: Diagnosis 1:  Chronic LBP with R LE Radiculopathy.  Pain -  US, manual therapy, splint/taping/bracing/orthotics, self management, education, directional preference exercise and home program  Decreased strength - therapeutic exercise, therapeutic activities and home program  Inflammation - self management/home program  Impaired gait - gait training and home program  Impaired muscle performance - neuro re-education and home program  Decreased function - therapeutic activities and home program  STG/LTGs have been met or progress has been made towards goals:  Yes (See Goal flow sheet completed today.)  Assessment of Progress: The patient's condition is improving.  The patient has met all of their  long term goals.  Self Management Plans:  Patient is independent in a home treatment program.  Patient is independent in self management of symptoms.  I have re-evaluated this patient and find that the nature, scope, duration and intensity of  Re: Taty Carrington   :   1950, page 2    the therapy is appropriate for the medical condition of the patient.  Taty continues to require the following intervention to meet STG and LTG's:  PT intervention is no longer required to meet STG/LTG.    Recommendations:  This patient is ready to be discharged from therapy and continue their home treatment program.        Thank you for your referral.    INQUIRIES  Therapist: Gely Tejada PT ([place])   [address] [address]  Phone: [phone]  Fax: [phone]

## 2022-04-12 NOTE — PROGRESS NOTES
Subjective:  HPI  Physical Exam  Oswestry Score: 17.78 %                 Objective:  System    Physical Exam    General     ROS    Assessment/Plan:    DISCHARGE REPORT    Progress reporting period is from 3/8/22 to 4/12/22.       SUBJECTIVE  Subjective changes noted by patient:    Taty reports improvement with intermittant vs constant LBP. Able to stand 30 mins w/o numbness in right foot. Using back brace PRN for external support. HEP going well.    Current Pain level: 2/10.     Initial Pain level: 5/10.   Changes in function:  Yes (See Goal flowsheet attached for changes in current functional level)  Adverse reaction to treatment or activity: None    OBJECTIVE  Changes noted in objective findings:    Full painfree trunk ROM. Antalgic gait due to knee pain. Pursuing steroid injections for knees which may help back if gait improves.     ASSESSMENT/PLAN  Updated problem list and treatment plan: Diagnosis 1:  Chronic LBP with R LE Radiculopathy Pain -  US, manual therapy, splint/taping/bracing/orthotics, self management, education, directional preference exercise and home program  Decreased strength - therapeutic exercise, therapeutic activities and home program  Inflammation - self management/home program  Impaired gait - gait training and home program  Impaired muscle performance - neuro re-education and home program  Decreased function - therapeutic activities and home program  STG/LTGs have been met or progress has been made towards goals:  Yes (See Goal flow sheet completed today.)  Assessment of Progress: The patient's condition is improving.  The patient has met all of their long term goals.  Self Management Plans:  Patient is independent in a home treatment program.  Patient is independent in self management of symptoms.  I have re-evaluated this patient and find that the nature, scope, duration and intensity of the therapy is appropriate for the medical condition of the patient.  Taty continues to require  the following intervention to meet STG and LTG's:  PT intervention is no longer required to meet STG/LTG.    Recommendations:  This patient is ready to be discharged from therapy and continue their home treatment program.    Please refer to the daily flowsheet for treatment today, total treatment time and time spent performing 1:1 timed codes.

## 2022-05-22 ENCOUNTER — HEALTH MAINTENANCE LETTER (OUTPATIENT)
Age: 72
End: 2022-05-22

## 2022-09-18 ENCOUNTER — HEALTH MAINTENANCE LETTER (OUTPATIENT)
Age: 72
End: 2022-09-18

## 2023-01-29 ENCOUNTER — HEALTH MAINTENANCE LETTER (OUTPATIENT)
Age: 73
End: 2023-01-29

## 2024-02-25 ENCOUNTER — HEALTH MAINTENANCE LETTER (OUTPATIENT)
Age: 74
End: 2024-02-25

## 2024-11-18 ENCOUNTER — TRANSCRIBE ORDERS (OUTPATIENT)
Dept: OTHER | Age: 74
End: 2024-11-18

## 2024-11-18 DIAGNOSIS — M54.50 CHRONIC BILATERAL LOW BACK PAIN WITHOUT SCIATICA: Primary | ICD-10-CM

## 2024-11-18 DIAGNOSIS — G89.29 CHRONIC BILATERAL LOW BACK PAIN WITHOUT SCIATICA: Primary | ICD-10-CM

## 2024-11-19 ENCOUNTER — TRANSCRIBE ORDERS (OUTPATIENT)
Dept: OTHER | Age: 74
End: 2024-11-19

## 2024-11-21 ENCOUNTER — THERAPY VISIT (OUTPATIENT)
Dept: PHYSICAL THERAPY | Facility: CLINIC | Age: 74
End: 2024-11-21
Attending: FAMILY MEDICINE
Payer: MEDICARE

## 2024-11-21 DIAGNOSIS — M25.551 HIP PAIN, RIGHT: ICD-10-CM

## 2024-11-21 DIAGNOSIS — M54.50 CHRONIC RIGHT-SIDED LOW BACK PAIN WITHOUT SCIATICA: Primary | ICD-10-CM

## 2024-11-21 DIAGNOSIS — G89.29 CHRONIC RIGHT-SIDED LOW BACK PAIN WITHOUT SCIATICA: Primary | ICD-10-CM

## 2024-11-21 ASSESSMENT — ACTIVITIES OF DAILY LIVING (ADL)
HOS_ADL_SCORE(%): 54.41
GETTING_INTO_AND_OUT_OF_AN_AVERAGE_CAR: SLIGHT DIFFICULTY
HOS_ADL_HIGHEST_POTENTIAL_SCORE: 68
STEPPING_UP_AND_DOWN_CURBS: SLIGHT DIFFICULTY
HEAVY_WORK: EXTREME DIFFICULTY
HOS_ADL_ITEM_SCORE_TOTAL: 37
GOING_DOWN_1_FLIGHT_OF_STAIRS: MODERATE DIFFICULTY
WALKING_UP_STEEP_HILLS: MODERATE DIFFICULTY
GETTING_INTO_AND_OUT_OF_A_BATHTUB: UNABLE TO DO
PUTTING_ON_SOCKS_AND_SHOES: SLIGHT DIFFICULTY
STANDING_FOR_15_MINUTES: MODERATE DIFFICULTY
WALKING_DOWN_STEEP_HILLS: MODERATE DIFFICULTY
ROLLING_OVER_IN_BED: SLIGHT DIFFICULTY
DEEP_SQUATTING: EXTREME DIFFICULTY
WALKING_15_MINUTES_OR_GREATER: MODERATE DIFFICULTY
GOING_UP_1_FLIGHT_OF_STAIRS: SLIGHT DIFFICULTY
TWISTING/PIVOTING_ON_INVOLVED_LEG: SLIGHT DIFFICULTY
LIGHT_TO_MODERATE_WORK: SLIGHT DIFFICULTY
HOS_ADL_COUNT: 17
WALKING_APPROXIMATELY_10_MINUTES: SLIGHT DIFFICULTY
RECREATIONAL_ACTIVITIES: MODERATE DIFFICULTY
WALKING_INITIALLY: MODERATE DIFFICULTY
SITTING_FOR_15_MINUTES: NO DIFFICULTY AT ALL

## 2024-11-21 NOTE — PROGRESS NOTES
PHYSICAL THERAPY EVALUATION  Type of Visit: Evaluation       Fall Risk Screen:  Fall screen completed by: PT  Have you fallen 2 or more times in the past year?: No  Have you fallen and had an injury in the past year?: No  Is patient a fall risk?: No    Subjective         Presenting condition or subjective complaint: lower back pain while standing (chronic, >6 years) & hip pain with first few steps of walking (started a few weeks ago, no specific incident or injury)  Date of onset: 11/07/24    Relevant medical history:     Dates & types of surgery: (Patient-Rptd) knee replacement,both knees (L 10/3/22, R 5/1/23)    Prior diagnostic imaging/testing results: (Patient-Rptd) X-ray     Prior therapy history for the same diagnosis, illness or injury: (Patient-Rptd) Yes      Prior Level of Function  Transfers:   Ambulation:   ADL:   IADL:     Living Environment  Social support: (Patient-Rptd) With a significant other or spouse   Type of home: (Patient-Rptd) House   Stairs to enter the home: (Patient-Rptd) Yes (Patient-Rptd) 2 Is there a railing: (Patient-Rptd) Yes     Ramp: (Patient-Rptd) No   Stairs inside the home: (Patient-Rptd) Yes (Patient-Rptd) 14 Is there a railing: (Patient-Rptd) Yes     Help at home: (Patient-Rptd) None  Equipment owned:       Employment: (Patient-Rptd) No    Hobbies/Interests: (Patient-Rptd) read&movies    Patient goals for therapy: (Patient-Rptd) stand longer&walk more    Pain assessment: Pain present  See objective evaluation for additional pain details     Objective   LUMBAR SPINE EVALUATION  PAIN: Pain Level at Rest: 2/10 in hip, 4/10 in low back  Pain Level with Use: 6/10 in hip with first few steps of walking, 6/10 in low back w/ prolonged standing  Pain Location: R groin, R side of low back  Pain Quality: Sharp pain in R hip, ache in low back  Pain Frequency: constant unless lying down  Pain is Worst: depends on position/ activity vs time of day  Pain is Exacerbated By: first few steps of  walking after sitting for R hip, low back with prolonged standing  Pain is Relieved By: rest and walking longer for R hip  Pain Progression: Worsened    POSTURE: Standing Posture: Rounded shoulders, Forward head  Sitting Posture: Rounded shoulders, Forward head, Thoracic kyphosis increased  GAIT:   Weightbearing Status: WBAT  Assistive Device(s): None  Gait Deviations: Base of support increased  Stride length decreased  Wendy decreased    ROM:  Lumbar Flex to toes, Ext 75% ++pull R anterior thigh, +pain low back, SB L to knee ++pull R, R to knee, Rotation WNL/EQ B   PELVIC/SI SCREEN:  ASIS level, medial malleoli level  STRENGTH:  Hip Flex L 5/5, R 3+/5 ++pain, Abd 3/5  B     MYOTOMES:  All levels WNL/ EQ B     FLEXIBILITY: Decreased hip flexors R (pain increase in low back w/ inreased prone hip ext  LUMBAR/HIP Special Tests:  (+) NATHAN R (++pain anterior hip), (?) L (stretch in anterior/lateral hip     PELVIS/SI SPECIAL TESTS:  ASIS level, medial malleoli level,   FUNCTIONAL TESTS:  Independent but slowed transfers and bed mobility  PALPATION:  Tenderness B GT, Glute med, Piriformis, TFL, ITB      Assessment & Plan   CLINICAL IMPRESSIONS  Medical Diagnosis: Chronic bilateral low back pain without sciatica    Treatment Diagnosis: Chronic bilateral low back pain without sciatica, Right Hip pain   Impression/Assessment: Patient is a 74 year old female with Right hip and low back complaints.  The following significant findings have been identified: Pain, Decreased ROM/flexibility, Decreased strength, Impaired gait, Impaired muscle performance, Decreased activity tolerance, and Impaired posture. These impairments interfere with their ability to perform self care tasks, recreational activities, household chores, household mobility, and community mobility as compared to previous level of function.     Clinical Decision Making (Complexity):  Clinical Presentation: Stable/Uncomplicated  Clinical Presentation Rationale:  based on medical and personal factors listed in PT evaluation  Clinical Decision Making (Complexity): Low complexity    PLAN OF CARE  Treatment Interventions:  Modalities: E-stim, Hot Pack, Ultrasound, cupping  Interventions: Manual Therapy, Neuromuscular Re-education, Therapeutic Activity, Therapeutic Exercise, Self-Care/Home Management    Long Term Goals     PT Goal 1  Goal Identifier: Transfers  Goal Description: Patient will be able to get up after sitting w/ pain to no worse than 2/10  Rationale: to maximize safety and independence with performance of ADLs and functional tasks;to maximize safety and independence within the home;to maximize safety and independence within the community;to maximize safety and independence with self cares  Target Date: 02/13/25      Frequency of Treatment: 1x/week  Duration of Treatment: x8 weeks, tapering to 2x/month x 1 month    Recommended Referrals to Other Professionals:  none  Education Assessment:   Learner/Method: Patient;Significant Other;Listening;Reading;Demonstration;Pictures/Video    Risks and benefits of evaluation/treatment have been explained.   Patient/Family/caregiver agrees with Plan of Care.     Evaluation Time:     PT Eval, Low Complexity Minutes (00478): 25       Signing Clinician: Amanda Hilligoss, PT        [facility]                                                                                   OUTPATIENT PHYSICAL THERAPY      PLAN OF TREATMENT FOR OUTPATIENT REHABILITATION   Patient's Last Name, First Name, Taty Valdez YOB: 1950   Provider's Name   [facility]   Medical Record No.  [id number]     Onset Date: 11/07/24  Start of Care Date: 11/21/24     Medical Diagnosis:  Chronic bilateral low back pain without sciatica      PT Treatment Diagnosis:  Chronic bilateral low back pain without sciatica, Right Hip pain Plan of Treatment  Frequency/Duration: 1x/week/ x8 weeks,  tapering to 2x/month x 1 month    Certification date from 11/21/24 to 02/18/25         See note for plan of treatment details and functional goals     Amanda Hilligoss, PT                         I CERTIFY THE NEED FOR THESE SERVICES FURNISHED UNDER        THIS PLAN OF TREATMENT AND WHILE UNDER MY CARE .             Physician Signature               Date    X_____________________________________________________                  Referring Provider:  Kin Ragland    Initial Assessment  See Epic Evaluation- Start of Care Date: 11/21/24

## 2024-12-19 ENCOUNTER — THERAPY VISIT (OUTPATIENT)
Dept: PHYSICAL THERAPY | Facility: CLINIC | Age: 74
End: 2024-12-19
Payer: MEDICARE

## 2024-12-19 DIAGNOSIS — G89.29 CHRONIC RIGHT-SIDED LOW BACK PAIN WITHOUT SCIATICA: Primary | ICD-10-CM

## 2024-12-19 DIAGNOSIS — M25.551 HIP PAIN, RIGHT: ICD-10-CM

## 2024-12-19 DIAGNOSIS — M54.50 CHRONIC RIGHT-SIDED LOW BACK PAIN WITHOUT SCIATICA: Primary | ICD-10-CM

## 2024-12-26 ENCOUNTER — THERAPY VISIT (OUTPATIENT)
Dept: PHYSICAL THERAPY | Facility: CLINIC | Age: 74
End: 2024-12-26
Payer: MEDICARE

## 2024-12-26 DIAGNOSIS — M54.50 CHRONIC RIGHT-SIDED LOW BACK PAIN WITHOUT SCIATICA: Primary | ICD-10-CM

## 2024-12-26 DIAGNOSIS — G89.29 CHRONIC RIGHT-SIDED LOW BACK PAIN WITHOUT SCIATICA: Primary | ICD-10-CM

## 2024-12-26 DIAGNOSIS — M25.551 HIP PAIN, RIGHT: ICD-10-CM

## 2025-01-02 ENCOUNTER — THERAPY VISIT (OUTPATIENT)
Dept: PHYSICAL THERAPY | Facility: CLINIC | Age: 75
End: 2025-01-02
Payer: MEDICARE

## 2025-01-02 DIAGNOSIS — G89.29 CHRONIC RIGHT-SIDED LOW BACK PAIN WITHOUT SCIATICA: Primary | ICD-10-CM

## 2025-01-02 DIAGNOSIS — M54.50 CHRONIC RIGHT-SIDED LOW BACK PAIN WITHOUT SCIATICA: Primary | ICD-10-CM

## 2025-01-02 DIAGNOSIS — M25.551 HIP PAIN, RIGHT: ICD-10-CM

## 2025-01-23 ENCOUNTER — THERAPY VISIT (OUTPATIENT)
Dept: PHYSICAL THERAPY | Facility: CLINIC | Age: 75
End: 2025-01-23
Payer: MEDICARE

## 2025-01-23 DIAGNOSIS — M54.50 CHRONIC RIGHT-SIDED LOW BACK PAIN WITHOUT SCIATICA: Primary | ICD-10-CM

## 2025-01-23 DIAGNOSIS — G89.29 CHRONIC RIGHT-SIDED LOW BACK PAIN WITHOUT SCIATICA: Primary | ICD-10-CM

## 2025-01-23 DIAGNOSIS — M25.551 HIP PAIN, RIGHT: ICD-10-CM

## 2025-01-23 ASSESSMENT — ACTIVITIES OF DAILY LIVING (ADL)
HOW_WOULD_YOU_RATE_YOUR_CURRENT_LEVEL_OF_FUNCTION_DURING_YOUR_USUAL_ACTIVITIES_OF_DAILY_LIVING_FROM_0_TO_100_WITH_100_BEING_YOUR_LEVEL_OF_FUNCTION_PRIOR_TO_YOUR_HIP_PROBLEM_AND_0_BEING_THE_INABILITY_TO_PERFORM_ANY_OF_YOUR_USUAL_DAILY_ACTIVITIES?: 90
WALKING_APPROXIMATELY_10_MINUTES: NO DIFFICULTY AT ALL
STANDING_FOR_15_MINUTES: SLIGHT DIFFICULTY
GETTING_INTO_AND_OUT_OF_A_BATHTUB: UNABLE TO DO
WALKING_DOWN_STEEP_HILLS: SLIGHT DIFFICULTY
GOING_DOWN_1_FLIGHT_OF_STAIRS: SLIGHT DIFFICULTY
STEPPING_UP_AND_DOWN_CURBS: SLIGHT DIFFICULTY
DEEP_SQUATTING: MODERATE DIFFICULTY
HOS_ADL_COUNT: 17
SITTING_FOR_15_MINUTES: NO DIFFICULTY AT ALL
LIGHT_TO_MODERATE_WORK: NO DIFFICULTY AT ALL
HEAVY_WORK: SLIGHT DIFFICULTY
WALKING_INITIALLY: NO DIFFICULTY AT ALL
GETTING_INTO_AND_OUT_OF_AN_AVERAGE_CAR: NO DIFFICULTY AT ALL
WALKING_15_MINUTES_OR_GREATER: NO DIFFICULTY AT ALL
PUTTING_ON_SOCKS_AND_SHOES: SLIGHT DIFFICULTY
HOS_ADL_HIGHEST_POTENTIAL_SCORE: 68
HOS_ADL_ITEM_SCORE_TOTAL: 53
WALKING_UP_STEEP_HILLS: SLIGHT DIFFICULTY
HOS_ADL_SCORE(%): 77.94
RECREATIONAL_ACTIVITIES: SLIGHT DIFFICULTY
GOING_UP_1_FLIGHT_OF_STAIRS: NO DIFFICULTY AT ALL
TWISTING/PIVOTING_ON_INVOLVED_LEG: SLIGHT DIFFICULTY
ROLLING_OVER_IN_BED: SLIGHT DIFFICULTY

## 2025-01-24 NOTE — PROGRESS NOTES
DISCHARGE  Reason for Discharge: Patient has met all goals.    Equipment Issued: none    Discharge Plan: Patient to continue home program.    Referring Provider:  Kin Ragland       01/23/25 0500   Appointment Info   Signing clinician's name / credentials Amanda Hilligoss, DPT, PRPC   Total/Authorized Visits 10 (E&T)   Visits Used 6   Medical Diagnosis Chronic bilateral low back pain without sciatica   PT Tx Diagnosis Chronic bilateral low back pain without sciatica, Right Hip pain   Quick Adds Certification   Progress Note/Certification   Start of Care Date 11/21/24   Onset of illness/injury or Date of Surgery 11/07/24   Therapy Frequency 1x/week   Predicted Duration x8 weeks, tapering to 2x/month x 1 month   Certification date from 11/21/24   Certification date to 02/18/25   Progress Note Due Date 01/19/25   Progress Note Completed Date 11/21/24   PT Goal 1   Goal Identifier Transfers   Goal Description Patient will be able to get up after sitting w/ pain to no worse than 2/10   Rationale to maximize safety and independence with performance of ADLs and functional tasks;to maximize safety and independence within the home;to maximize safety and independence within the community;to maximize safety and independence with self cares   Goal Progress 0-1/10 when getting up after sitting   Target Date 02/13/25   Date Met 01/23/25   Subjective Report   Subjective Report Pt notes R leg continues to feel weaker/heavier when doing activities with it. Feels overall much less pain in hip w/ transfers and ADLs. Has been performing HEP daily and feels mild fatigue in R>L, but no pain increase and these are getting easier.   Objective Measures   Objective Measures Objective Measure 1;Objective Measure 2;Objective Measure 3   Objective Measure 1   Objective Measure Lumbar AROM   Details Flex to toes, ext WFL + stiffness central low back, SB to knee B, rotation  mod limitation B   Objective Measure 2   Objective Measure  Strength   Details Hip Flex 5/5 B,(Endurance L 20/20, R 20/20 as of 1/2/25); Abd 4/5 B (5/5 if allowed to compensate into hip flex)   Objective Measure 3   Objective Measure Hip Outcome Score   Details 77.94 (improved from initial score of 54.41)   Treatment Interventions (PT)   Interventions Therapeutic Procedure/Exercise;Therapeutic Activity;Neuromuscular Re-education;Manual Therapy   Therapeutic Procedure/Exercise   Therapeutic Procedures: strength, endurance, ROM, flexibility minutes (39341) 18   Therapeutic Procedures Ther Proc 2;Ther Proc 3;Ther Proc 4;Ther Proc 5;Ther Proc 6;Ther Proc 7;Ther Proc 8;Ther Proc 9   Ther Proc 1 Repeated hip ext in standing   Ther Proc 1 - Details performing at least 5x/day for HEP   Ther Proc 2 SLR in Abd   Ther Proc 2 - Details w/ glutes against wall to target glute med x 15 B   Ther Proc 3 Piriformis stretch   Ther Proc 3 - Details HEP   Ther Proc 4 Lumbar rotation   Ther Proc 4 - Details reviewed HEP   Ther Proc 5 Prone hip ext   Ther Proc 5 - Details HEP   Ther Proc 6 Bridging   Ther Proc 6 - Details HEP   Ther Proc 7 SLR Flex   Ther Proc 7 - Details HEP   Ther Proc 8 Clamshell   Ther Proc 8 - Details HEP   Skilled Intervention guided progression of exercises and work on form, transition to independent management of sx w/ HEP   Ther Proc 9 NuStep   Ther Proc 9 - Details Seat 8, resistance 6, x 6 min, w/ mild fatigue in anterior thighs   Manual Therapy   Manual Therapy: Mobilization, MFR, MLD, friction massage minutes (54936) 13   Manual Therapy 1 MFR   Manual Therapy 1 - Details prone inferior fascial glide (PT standing above patient) over B lower lumbar and glutes x 3 min; fascial glides over B iliac crest region into glutes x 10 min total   Skilled Intervention adjustment of technique, intensity, and location based on patient tolerance and tissue response   Patient Response/Progress tenderness during, decreasing throughout MT   Education   Learner/Method Patient;Significant  Other;Listening;Reading;Demonstration;Pictures/Video   Plan   Home program PTRx updated   Updates to plan of care DC to HEP   Total Session Time   Timed Code Treatment Minutes 31   Total Treatment Time (sum of timed and untimed services) 31

## 2025-04-05 ENCOUNTER — HEALTH MAINTENANCE LETTER (OUTPATIENT)
Age: 75
End: 2025-04-05